# Patient Record
Sex: FEMALE | Employment: OTHER
[De-identification: names, ages, dates, MRNs, and addresses within clinical notes are randomized per-mention and may not be internally consistent; named-entity substitution may affect disease eponyms.]

---

## 2021-03-09 ENCOUNTER — TRANSCRIBE ORDERS (OUTPATIENT)
Dept: OTHER | Age: 41
End: 2021-03-09

## 2021-03-09 DIAGNOSIS — G20.A1 PARKINSON'S DISEASE (H): Primary | ICD-10-CM

## 2021-03-11 NOTE — TELEPHONE ENCOUNTER
RECORDS RECEIVED FROM: External   Date of Appt: 5/7/21   NOTES (FOR ALL VISITS) STATUS DETAILS   OFFICE NOTE from referring provider Care Everywhere Komal AVILES @ [facility] Neurology:  2/25/21   OFFICE NOTE from other specialist Care Everywhere Dr Jany Tsai @ [facility] Neurology:  11/25/20  10/7/20  8/20/20  3/4/20  2/6/20  (additional encounters in Care Everywhere)   DISCHARGE SUMMARY from hospital Care Everywhere [facility]:  2/5/21-2/6/21   DISCHARGE REPORT from the ER N/A    OPERATIVE REPORT N/A    MEDICATION LIST Care Everywhere    IMAGING  (FOR ALL VISITS)     EMG N/A    EEG Care Everywhere [facility]:  1/13/20   LUMBAR PUNCTURE N/A    CARLEY SCAN N/A    ULTRASOUND (CAROTID BILAT) *VASCULAR* N/A    MRI (HEAD, NECK, SPINE) Received [facility]:  MRI Brain 5/7/16   CT (HEAD, NECK, SPINE) Received  [facility]:  CT Head 2/4/21      Action 3/11/21 MV 9.53am   Action Taken Imaging request faxed to [facility] for:  MRI Brain 5/7/16  CT Head 2/4/21     Action 4/9/21 MV 12.53pm   Action Taken Images received from [facility] and resolved in PACS

## 2021-05-06 NOTE — PROGRESS NOTES
Department of Neurology  Movement Disorders Division   Initial Clinic Evaluation     Patient: Fiona Murphy   MRN: [id number]   : 1980   Date of Visit: 2021     Referring Physician: Komal Gabriel MD     Dear Dr. Gabriel, Thank you for your referral of Fiona Murphy for tremor, parkinsonism.    History of Present Illness    Fiona Murphy is a 40-years -old woman form [facility] who presents accompanied today by her mother and  staff from [facility] , her sister was on the phone.     Fiona houston was a diagnosis of Parkinson Disease , she is on L-dopa therapy with no clear benefit in terms of right hand tremor control and right foot pain.    Fiona is complaining of right big toe pain , numbness along the the bilateral upper extremities , and bilateral lower extremities tightness .    Fiona has a history of Global developmental Delay , Seizure ( 1st seisure in )  who was noted to have right hand tremor for at least 9 years . Per staff she was noted to drag her right leg before the onset of right hand tremor. 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     She takes Rytary 145 mg 2 tab 4 times a day and CD/LD CR 25/100 mg -2 tab at night. She might have worsening tremor by the end of the dose during the day . She was noted to have more shuffling gait in the morning and in the evening .  Staff from the [facility] got permission for videotaping , so reviewed 4 videos since 2021.   In one of the videos taken during the night she was noted to have random, flailing , flowing movements involving all extremities , looking like choreiform movements . Per staff this type of movements are exacerbated by the lack of sleep. Since 2021 she has difficulty falling asleep asleep , the Depakote dose was increased and alter Lithium was added with no benefit.     In one of the videos she was noted to have  "the right bot toe extended and c/o pain ( after the first dose of Rytary) . She c/o right foot pain and the bog toe is \"up\" in the morning.    In one of the videos she was noted to have frequent tongue movements , sitting on the ground.    As per chart review there is no exposure to dopamine receptors blocking agents .      Review of Systems:  Other than that mentioned above and at the end of this note, the remainder of 12 systems reviewed were negative.    Medications:  Current Outpatient Medications   Medication Sig Dispense Refill     carbidopa-levodopa (SINEMET CR)  MG CR tablet TAKE 2 TABLETS BY MOUTH ONCE DAILY AT 8PM       carbidopa-levodopa ER (RYTARY) 36. MG CPCR per CR capsule Take 2 capsules by mouth 4 times daily       divalproex sodium delayed-release (DEPAKOTE) 250 MG DR tablet Take 250 mg by mouth 3 times daily       divalproex sodium extended-release (DEPAKOTE ER) 500 MG 24 hr tablet 500 mg       fish oil-omega-3 fatty acids 1000 MG capsule Take 2 g by mouth daily       folic acid (FOLVITE) 1 MG tablet        gabapentin (NEURONTIN) 300 MG capsule 300 mg       lithium ER (LITHOBID) 300 MG CR tablet 300 mg       magnesium oxide (MAG-OX) 400 MG tablet        medroxyPROGESTERone (DEPO-PROVERA) 150 MG/ML IM injection Inject 150 mg into the muscle       Multiple Vitamin (DAILY-SAVANNA) TABS        propranolol (INDERAL) 10 MG tablet 10 mg       RYTARY 36. MG CPCR per CR capsule        trihexyphenidyl (ARTANE) 2 MG tablet 2 mg       vitamin D2 (ERGOCALCIFEROL) 39891 units (1250 mcg) capsule         PD Medications                   6 9:30 1 4:30 8   Rytary 145 mg                                                 2 2 2 2      CD/LD CR 25/100 mg                            2                    Trihexiphenydil                              Allergies: has No Known Allergies.    Past Medical History:   No past medical history on file.    Past Surgical History:   No past surgical history on " file.    Social History:   Social History     Socioeconomic History     Marital status: Single     Spouse name: Not on file     Number of children: Not on file     Years of education: Not on file     Highest education level: Not on file   Occupational History     Not on file   Social Needs     Financial resource strain: Not on file     Food insecurity     Worry: Not on file     Inability: Not on file     Transportation needs     Medical: Not on file     Non-medical: Not on file   Tobacco Use     Smoking status: Not on file   Substance and Sexual Activity     Alcohol use: Not on file     Drug use: Not on file     Sexual activity: Not on file   Lifestyle     Physical activity     Days per week: Not on file     Minutes per session: Not on file     Stress: Not on file   Relationships     Social connections     Talks on phone: Not on file     Gets together: Not on file     Attends Jain service: Not on file     Active member of club or organization: Not on file     Attends meetings of clubs or organizations: Not on file     Relationship status: Not on file     Intimate partner violence     Fear of current or ex partner: Not on file     Emotionally abused: Not on file     Physically abused: Not on file     Forced sexual activity: Not on file   Other Topics Concern     Not on file   Social History Narrative     Not on file       Family History:  No family history on file.       Physical Exam:  The patient's  weight is 53.5 kg (118 lb). Her blood pressure is 121/80 and her pulse is 84. Her respiration is 16 and oxygen saturation is 96%.    Neurological Examination:   She is alert , wears hearing aids, follows commands, becomes distracted very easily and requires frequent reminders and redirections .Speech is dysarthric .  Exam is challenging .  She is in no acute distress. She has intermittent right hand tremor , variable in frequency and amplitude, at times distractible.   Muscle tone: she definitely had difficulty  relaxing, tone was increased in all extremities, may be more in the RUE in comparison to LUE.  It is even more challenging to determine the degree of bradykinesias , but I had an impression tanika she has more bradykinesias with finger tapping and toe tapping on the right than on the left.  Gait: she ambulates with a limp ( I was told she has leg length discrepancy). Gait is narrow based , takes small steps, ambulates slowly.   During pull test I had to cath Fiona, but I am not sure if she understood my directions , even after I repeated them a  couple of times) .     Impression:  Fiona Murphy is a 40 year old female who hold a a diagnosis of Parkinson Disease with  right hand tremor and right foot dystonia. Her symptoms have a variable response to L-dopa therapy and exam with right hand tremor ( with a functional component) .    Recommendations: (as per Patient Instructions).    Since some of the medications you are taking do not provide benefit in terms of tremor control or cause side effects , so we recommend the following :    1.  We recommend discontinue Propranolol 10 mg as following:     Day 1-5 stop the morning dose  Day 6-10 - stop the midday dose  Day 10-15- stop the evening dose       2. Then after getting off Propranolol we recommend discontinue Artane as following:    Week 1- stop the morning dose  Week 2- stop the 4:30 pm dose     3. After being off Propranolol and Artane , report back to us with your symptoms and we will recommend how to taper off the bed time dose of Sinemet.    4. We ordered a special brain scan called dopamine transporter scan to evaluate for presynaptic dopaminergic deficiency.        Thank you for the opportunity to share in the care of this patient.  Please feel free to contact me if you have any further questions or comments.    Patient seen and discussed with Dr. Han.    Sincerely,    Glenna Sunshine MD   Movement Disorders Fellow      Patient seen and examined with Dr. Sunshine  "and I agree with the assessment and plan as outlined.  The RUE tremor I saw today looked functional, not a classical Parkinsonian resting tremor, but functional overlay is common in Parkinson's disease, including functional - on - organic tremor, and the description of tremor worsening at the end of a dose interval was, at least to me, convincing.  Ms. BARNEY herself also gave a rather convincing description of a striatal toe, i.e. R foot dystonia typical for PD;  in her, it appears to be occurring shortly after her first levodopa dose of the morning, so it's not necessarily off dystonia, could be a dystonic dyskinesia.  Tone was difficult to asess, as she had difficulty relaxing her limbs, but I do think she  had R > LUE rigidity.  Her gait was variable;  when formally examined, it was not very parkinsonian but at the end of the visit, leaving the exam room, it looked parkinsonian (This is the opposite of the pattern I'd expect with functional gait, and conceivably represented wearing off at the end of a dose interval).  At least one of the videos (in bed, at night, in February) looked like severe generalized chorea, e.g. really severe levodopa induced dyskinesias of Parkinson's.  (It could also be seen in Lowber's but would be more nearly continuous, not nearly so episodic as her those movements have been.)  Other videos showed movements which looked like having at least some behavioral component.  The choreiform movements are not, however, altogether typical for Parkinson's in that they Patient seen and examined with Dr. Sunshine and I agree with the assessment and plan as outlined.occurred in the middle of the night, when levodopa/dopamine levels would be low.  Possible hypotheses to account for that would be:  A buildup effect of long-acting dopamine formulations accumulating over the course of the day.  \"OFF dyskinesias\" (rare, but reported).  Very delayed gastric emptying or very prolonged gut transit time " ENT resulting in accumulated levodopa in the gut being absorbed en cydney, very much later than the doses were taken.  A constant propensity to dyskinesias, without much diurnal variation, with the movements flaring up in response to sleep deprivation or distress (which typically make chorea of any etiology worse.)  Could the nocturnal problem be primarily discomfort (with writing movements secondary to that)?  If so, dopamine-withdrawal akathisia or RLS might be considerations.  Involuntary movements of almost any kind could be tardive, due to past exposure to dopamine receptor blockers, including drugs for nausea or GI dysmotility, but mostly neuroleptics;  that would be plausible, in someone like Ms. CLINTON, but there has been no such exposure, as far as we could determine.  It's also possible that I'm wrong, that there is no underlying organic condition and these episodes are entirely behavioral.  Plan:  We'll begin by attempting to get rid of some medications which may be superfluous.  First the Artane, which according to the history we got today, was ineffective.  That differs a little from what's been reported in her records, but it's worth establishing, empirically, whether Artane is really helping, or not, since the potential for CNS side effects is non-negligeable.  Propanolol, also described to us today as ineffective, is a little less likely to be causing side effects, but also less likely to be helping her.  Next we'll try stopping the bedtime CR Sinemet, which was reported to be ineffective for the morning R foot dystonia, if that's what it is, and might account for the nocturnal dyskinesias, if that's what they are (although it's rather a low dose, to be doing that).  Meanwhile, we'll try to obtain a CARLEY scan, to help in determining whether there is underlying organic Parkinson's disease.    Tonny Han PhD, MD

## 2021-05-07 ENCOUNTER — OFFICE VISIT (OUTPATIENT)
Dept: NEUROLOGY | Facility: CLINIC | Age: 41
End: 2021-05-07
Attending: PHYSICIAN ASSISTANT
Payer: MEDICARE

## 2021-05-07 VITALS
RESPIRATION RATE: 16 BRPM | OXYGEN SATURATION: 96 % | DIASTOLIC BLOOD PRESSURE: 80 MMHG | WEIGHT: 118 LBS | SYSTOLIC BLOOD PRESSURE: 121 MMHG | HEART RATE: 84 BPM

## 2021-05-07 DIAGNOSIS — R25.1 TREMOR: Primary | ICD-10-CM

## 2021-05-07 PROCEDURE — 99204 OFFICE O/P NEW MOD 45 MIN: CPT | Mod: GC | Performed by: PSYCHIATRY & NEUROLOGY

## 2021-05-07 RX ORDER — DIVALPROEX SODIUM 500 MG/1
500 TABLET, EXTENDED RELEASE ORAL
COMMUNITY
Start: 2021-02-18 | End: 2021-08-03

## 2021-05-07 RX ORDER — DIVALPROEX SODIUM 250 MG/1
250 TABLET, DELAYED RELEASE ORAL 3 TIMES DAILY
COMMUNITY
End: 2021-08-03

## 2021-05-07 RX ORDER — ERGOCALCIFEROL 1.25 MG/1
CAPSULE, LIQUID FILLED ORAL
COMMUNITY
Start: 2021-04-20 | End: 2024-04-26

## 2021-05-07 RX ORDER — LEVODOPA AND CARBIDOPA 145; 36.25 MG/1; MG/1
CAPSULE, EXTENDED RELEASE ORAL
COMMUNITY
Start: 2021-05-01 | End: 2022-03-16

## 2021-05-07 RX ORDER — CHLORAL HYDRATE 500 MG
2 CAPSULE ORAL DAILY
COMMUNITY
End: 2024-04-26

## 2021-05-07 RX ORDER — LITHIUM CARBONATE 300 MG/1
300 TABLET, FILM COATED, EXTENDED RELEASE ORAL
COMMUNITY
Start: 2021-04-23 | End: 2024-04-26

## 2021-05-07 RX ORDER — TEMAZEPAM 30 MG
30 CAPSULE ORAL
COMMUNITY
Start: 2021-04-08 | End: 2021-05-08

## 2021-05-07 RX ORDER — MULTIVITAMIN WITH FOLIC ACID 400 MCG
TABLET ORAL
COMMUNITY
Start: 2021-04-20

## 2021-05-07 RX ORDER — MAGNESIUM OXIDE 400 MG/1
TABLET ORAL
COMMUNITY
Start: 2021-04-20 | End: 2024-04-26

## 2021-05-07 RX ORDER — MEDROXYPROGESTERONE ACETATE 150 MG/ML
150 INJECTION, SUSPENSION INTRAMUSCULAR
COMMUNITY
Start: 2020-07-21

## 2021-05-07 RX ORDER — CARBIDOPA AND LEVODOPA 25; 100 MG/1; MG/1
TABLET, EXTENDED RELEASE ORAL
COMMUNITY
Start: 2020-09-23 | End: 2021-05-26

## 2021-05-07 RX ORDER — FOLIC ACID 1 MG/1
TABLET ORAL
COMMUNITY
Start: 2021-04-20 | End: 2024-04-26

## 2021-05-07 RX ORDER — GABAPENTIN 300 MG/1
300 CAPSULE ORAL
COMMUNITY
Start: 2021-04-20

## 2021-05-07 RX ORDER — TRIHEXYPHENIDYL HYDROCHLORIDE 2 MG/1
2 TABLET ORAL
COMMUNITY
Start: 2021-02-25 | End: 2021-10-19

## 2021-05-07 RX ORDER — PROPRANOLOL HYDROCHLORIDE 10 MG/1
10 TABLET ORAL
COMMUNITY
Start: 2021-04-22 | End: 2021-10-19

## 2021-05-07 ASSESSMENT — PAIN SCALES - GENERAL: PAINLEVEL: EXTREME PAIN (8)

## 2021-05-07 NOTE — PATIENT INSTRUCTIONS
Since some of the medications you are taking do not provide benefit in terms of tremor control or cause side effects , so we recommend the following :    1.  We recommend discontinue Propranolol 10 mg as following:     Day 1-5 stop the morning dose  Day 6-10 - stop the midday dose  Day 10-15- stop the evening dose       2. Then after getting off Propranolol we recommend discontinue Artane as following:    Week 1- stop the morning dose  Week 2- stop the 4:30 pm dose     3. After being off Propranolol and Artane , report back to us with your symptoms and we will recommend how to taper off the bed time dose of Sinemet.    4. We ordered a special brain scan called dopamine transporter scan to evaluate for presynaptic dopaminergic deficiency.

## 2021-05-07 NOTE — NURSING NOTE
Chief Complaint   Patient presents with     Consult     P NEW MOVEMENT DISORDER      Nam Valdivia

## 2021-05-17 ENCOUNTER — PRE VISIT (OUTPATIENT)
Dept: NEUROLOGY | Facility: CLINIC | Age: 41
End: 2021-05-17

## 2021-05-24 ENCOUNTER — TELEPHONE (OUTPATIENT)
Dept: NEUROLOGY | Facility: CLINIC | Age: 41
End: 2021-05-24

## 2021-05-24 DIAGNOSIS — R25.1 TREMOR: Primary | ICD-10-CM

## 2021-05-26 RX ORDER — CARBIDOPA AND LEVODOPA 25; 100 MG/1; MG/1
TABLET, EXTENDED RELEASE ORAL
Qty: 180 TABLET | Refills: 0 | Status: SHIPPED | OUTPATIENT
Start: 2021-05-26 | End: 2021-10-19

## 2021-05-26 NOTE — TELEPHONE ENCOUNTER
Please wean off Carbidopa/Levodopa CR 25/100 mg- 2 tab at bedtime as following:     Week 1: 1 tab at bedtime daily   Week 2: 1/2 tab at bedtime daily   Week 3: 1/2 tab amrit other day at bedtime  Week 4: Stop      Please, report back to us in 1-2 weeks being off .    Glenna Sunshine MD  Movement Disorders Fellow

## 2021-05-26 NOTE — CONFIDENTIAL NOTE
Returned call and discussed with Britt about Fiona's symptoms . She continues to have involuntary movements ( flailing and shaking of extremities) . She has 2 more days of Trihexiphenydil before she is weaned off.  She will be started on Seroquel 50 mg at bedtime per psychiatrist.  She continues to have good and bad days .    As discussed during clinic visit , next step is to wean off Carbidopa/Levodopa CR 25/100 mg- 2 tab at bedtime as following:    Week 1: 1 tab at bedtime daily   Week 2: 1/2 tab at bedtime daily   Week 3: 1/2 tab amrit other day at bedtime  Week 4: Stop     Please, report back to us in 1-2 weeks being off Carbidopa/Levodopa CR 25/100 mg.    Glenna Sunshine MD  Movement Disorders Fellow

## 2021-06-19 ENCOUNTER — TELEPHONE (OUTPATIENT)
Dept: NUCLEAR MEDICINE | Facility: CLINIC | Age: 41
End: 2021-06-19

## 2021-06-28 ENCOUNTER — TELEPHONE (OUTPATIENT)
Dept: NEUROLOGY | Facility: CLINIC | Age: 41
End: 2021-06-28

## 2021-06-28 NOTE — TELEPHONE ENCOUNTER
"Spoke to Britt. Recommended she ask the proceduralist doing the colonoscopy if she could have her Rytary with a small sip of water. The doctor's care team told her \"absolutely not.\"    Britt needs an order stating:    \"On day of colonoscopy, OK to give 1:00 dose of Rytary to patient after colonoscopy and adjust the following two medication administration times per regular dose intervals (3.5 hours after).\"    This order will need to be faxed to Britt at: [phone]  "

## 2021-06-28 NOTE — TELEPHONE ENCOUNTER
Letter was fax to Britt @ [phone], per Melody AGUILERA.number was provided by Melody in message

## 2021-06-28 NOTE — TELEPHONE ENCOUNTER
[facility] Call Center    Phone Message    May a detailed message be left on voicemail: yes     Reason for Call: Medication Question or concern regarding medication   Prescription Clarification  Name of Medication: RYTARY 36. MG CPCR per CR capsule  Prescribing Provider: Dr. Han   Pharmacy: NA   What on the order needs clarification? Per Britt at [facility], Pt is to have colonoscopy and cannot have anything to eat or drink 3 hour prior. Therefore Pt will miss one dose of medication.    Please fax Britt to let her know if this is okay at [phone]          Action Taken: Message routed to:  [facility] Neurology    Travel Screening: Not Applicable

## 2021-06-28 NOTE — LETTER
June 28, 2021          To whom it may concern,    On day of colonoscopy, OK to give 1:00 dose of Rytary to patient after colonoscopy and adjust the following two medication administration times per regular dose intervals (3.5 hours after).    Sincerely,  Diana Fink MD

## 2021-06-29 ENCOUNTER — TELEPHONE (OUTPATIENT)
Dept: NEUROLOGY | Facility: CLINIC | Age: 41
End: 2021-06-29

## 2021-06-29 ENCOUNTER — TRANSFERRED RECORDS (OUTPATIENT)
Dept: HEALTH INFORMATION MANAGEMENT | Facility: CLINIC | Age: 41
End: 2021-06-29

## 2021-06-29 NOTE — TELEPHONE ENCOUNTER
"Spoke to Britt and gave her the message below and reminded her the daTscan is in [place]. She thanked me for the call.    ----- Message from Tonny Han MD sent at 6/29/2021  3:21 PM CDT -----  Regarding: Please respond (briefly) by 'phone to fax received.  We got a fax regarding this patient from  Britt Lynn  , [facility] [employer]  Cell: [phone]  Work: [phone]  Fax: [phone]    At last visit we asked the staff at the home where pt lives for a report on how the pt responded to the med changes we made, and this was the response.    Ms. Lynn (the staff, not the patient) concludes by asking \"let me know how you would like to proceed\"    Could you call her and tell her that I want to leave things as they are, and I'll consider next steps after the Naveed scan (which is scheduled July 9)?  Maybe also remind them it's at a different location from the appointment with me.    Thanks.    --SC      "

## 2021-07-09 ENCOUNTER — ANCILLARY PROCEDURE (OUTPATIENT)
Dept: NUCLEAR MEDICINE | Facility: CLINIC | Age: 41
End: 2021-07-09
Payer: MEDICARE

## 2021-07-09 ENCOUNTER — TRANSFERRED RECORDS (OUTPATIENT)
Dept: HEALTH INFORMATION MANAGEMENT | Facility: CLINIC | Age: 41
End: 2021-07-09

## 2021-07-09 DIAGNOSIS — R25.1 TREMOR: ICD-10-CM

## 2021-07-09 PROCEDURE — A9584 IODINE I-123 IOFLUPANE: HCPCS | Performed by: STUDENT IN AN ORGANIZED HEALTH CARE EDUCATION/TRAINING PROGRAM

## 2021-07-09 PROCEDURE — G1004 CDSM NDSC: HCPCS | Performed by: STUDENT IN AN ORGANIZED HEALTH CARE EDUCATION/TRAINING PROGRAM

## 2021-07-09 PROCEDURE — 78803 RP LOCLZJ TUM SPECT 1 AREA: CPT | Mod: 53 | Performed by: STUDENT IN AN ORGANIZED HEALTH CARE EDUCATION/TRAINING PROGRAM

## 2021-07-09 NOTE — PROGRESS NOTES
Pt presented at [facility] for a DatScan om 7/9/21. Pt was in a good and compliant mood at onset. Pt took Lugol's 2 ml without a problem at 1020. IV started. Returned 1 hour post Lugols' for nuclear injection. Pt with mother until returning for actual scan at approximately 1345. During scan patient started to move uncontrollably. Scan was discontinued and Pt's symptoms worsened. Help was alerted and respond. Rapid then called and team responded. We where unable to obtain BP or 12 lead due to Pt's convulsion like movements. Ambulance called as team continued calming techniques. Pt complained of tight pressure and severe pain in upper abdomen throughout treatment. EMS responded and decision was make to transported Pt to [facility] for further treatment.

## 2021-07-09 NOTE — PROGRESS NOTES
Pt presented at [facility] for a DatScan om 7/9/21. Pt was in a good and compliant mood at onset. Pt took Lugol's 2 ml without a problem at 1020. IV started. Returned 1 hour post Lugols' for nuclear injection. Pt with mother until returning for actual scan at approximately 1345. During scan patient started to move uncontrollably. Scan was discontinued and Pt's symptoms worsned. Help was alerated and respond. Rapid then called and team responded. We where unable to obtain BP or 12 lead due to Pt's convulsion like movements. Ambulance called as team continued calming techniques. Pt complained of tight pressure and severe pain in upper abdomen throughout treatment. EMS responded and decision was make to transported Pt to [facility] for further treatment.

## 2021-07-22 DIAGNOSIS — R25.1 TREMOR: Primary | ICD-10-CM

## 2021-07-22 NOTE — TELEPHONE ENCOUNTER
Health Call Center    Phone Message    May a detailed message be left on voicemail: yes     Reason for Call: Medication Refill Request    Has the patient contacted the pharmacy for the refill? Yes   Name of medication being requested: RYTARY 36. MG CPCR per CR capsule [[id number]] (Order [id number])  Provider who prescribed the medication:   Pharmacy: [facility] fax [phone]  Date medication is needed: ASAP    Patient switched care from [facility] to Dr. Han.  Danna prescribed the above medication and patient would like for Dr. Han to refill medication.  Patient takes 2 caps 4 times a day.  Please reach out to Britt should you have any questions.    Action Taken: Message routed to:  [facility] Neuro    Travel Screening: Not Applicable

## 2021-07-30 RX ORDER — DIVALPROEX SODIUM 250 MG/1
TABLET, DELAYED RELEASE ORAL
Qty: 31 TABLET | Refills: 11
Start: 2021-07-30

## 2021-07-30 RX ORDER — DIVALPROEX SODIUM 500 MG/1
TABLET, EXTENDED RELEASE ORAL
Qty: 93 TABLET | Refills: 11
Start: 2021-07-30

## 2021-07-30 NOTE — TELEPHONE ENCOUNTER
Rx Authorization:  Requested Medication/ Dose Divalproex Sodium 250 MG Tablet delayed release  Date last refill ordered:   Quantity ordered:   # refills:   Date of last clinic visit with ordering provider: 5/7/21  Date of next clinic visit with ordering provider:   All pertinent protocol data (lab date/result):   Include pertinent information from patients message:       Rx Authorization:  Requested Medication/ Dose Divalproex ER 500mg  Date last refill ordered:   Quantity ordered:   # refills:   Date of last clinic visit with ordering provider: 5/7/21  Date of next clinic visit with ordering provider:   All pertinent protocol data (lab date/result):   Include pertinent information from patients message:

## 2021-08-02 ENCOUNTER — TELEPHONE (OUTPATIENT)
Dept: NEUROLOGY | Facility: CLINIC | Age: 41
End: 2021-08-02

## 2021-08-02 DIAGNOSIS — G40.909 SEIZURE DISORDER (H): Primary | ICD-10-CM

## 2021-08-02 DIAGNOSIS — R25.1 TREMOR: ICD-10-CM

## 2021-08-02 NOTE — TELEPHONE ENCOUNTER
[facility] Call Center    Phone Message    May a detailed message be left on voicemail: yes     Reason for Call: Other: Britt at [facility] is faxing medication requests for Pt of Dr. Han. She just wanted to give clinic a heads up.     Action Taken: Message routed to:  [facility] Neurology    Travel Screening: Not Applicable

## 2021-08-03 RX ORDER — DIVALPROEX SODIUM 250 MG/1
TABLET, DELAYED RELEASE ORAL
Qty: 30 TABLET | Refills: 1 | Status: SHIPPED | OUTPATIENT
Start: 2021-08-03 | End: 2021-09-20

## 2021-08-03 RX ORDER — DIVALPROEX SODIUM 500 MG/1
TABLET, EXTENDED RELEASE ORAL
Qty: 90 TABLET | Refills: 1 | Status: SHIPPED | OUTPATIENT
Start: 2021-08-03 | End: 2021-09-20

## 2021-08-03 NOTE — TELEPHONE ENCOUNTER
I informed Britt that Dr. Han sent in a small supply to [facility]. I informed her he is movement disorder specialist and going forward she will need to see another epilepsy specialist to prescribe this. Britt stated Dr. Monroy actually referred her to have Fiona's psychiatrist prescribe it. Britt will be contacting the psychiatrist to work this out. She appreciates Dr. Han sending in some refills to buy her some time.    Depakote prescriptions faxed to [facility].

## 2021-08-17 ENCOUNTER — TELEPHONE (OUTPATIENT)
Dept: NEUROLOGY | Facility: CLINIC | Age: 41
End: 2021-08-17

## 2021-08-17 DIAGNOSIS — R25.1 TREMOR: Primary | ICD-10-CM

## 2021-08-17 NOTE — TELEPHONE ENCOUNTER
Health Call Center    Phone Message    May a detailed message be left on voicemail: yes     Reason for Call: Medication Question or concern regarding medication   Prescription Clarification  Name of Medication: some type of sedation needed in order for pt to do the scan on 9/21/21  Prescribing Provider: Dr. Tonny Han   Pharmacy: [facility] in [place]   What on the order needs clarification? Pt's mother reporting that pt was unable to do the scan the first time and will need sedation please. Thank you.          Action Taken: Message routed to:  [facility]    Travel Screening: Not Applicable

## 2021-08-18 RX ORDER — LORAZEPAM 0.5 MG/1
TABLET ORAL
Qty: 5 TABLET | Refills: 0 | Status: SHIPPED | OUTPATIENT
Start: 2021-08-18 | End: 2021-10-19

## 2021-08-20 NOTE — TELEPHONE ENCOUNTER
I let Michelle know Dr. Han had sent in an RX. I will call the group home with his instructions.    Spoke with Britt, coordinator at Fiona's group home. I read her Dr. Bruce instructions. She read them back to me correctly. She reports [place] has not allowed Fiona's mom to set up guardian access to MyChart and they do not know why. Upon chart review I noted we do not have the legal guardianship papers on file. This is likely the reason why. I gave Britt our fax number to get these papers sent to us. Britt will also fax us a report on how the lorazepam trial goes. They will try this tomorrow.

## 2021-09-06 ENCOUNTER — DOCUMENTATION ONLY (OUTPATIENT)
Dept: OTHER | Facility: CLINIC | Age: 41
End: 2021-09-06

## 2021-09-08 ENCOUNTER — TELEPHONE (OUTPATIENT)
Dept: NUCLEAR MEDICINE | Facility: CLINIC | Age: 41
End: 2021-09-08

## 2021-09-08 ENCOUNTER — TELEPHONE (OUTPATIENT)
Dept: NEUROLOGY | Facility: CLINIC | Age: 41
End: 2021-09-08

## 2021-09-08 DIAGNOSIS — G20.C PARKINSONISM (H): Primary | ICD-10-CM

## 2021-09-08 NOTE — TELEPHONE ENCOUNTER
Elliott Han,    Per the patient's medicare insurance, the Dx code of G20, Parkinsonism is required to be added onto the Datscan order for medical necessity purposes. Wondering if you'd be able to add this code to the current order? The patient is scheduled on 9/21/2021 and the order will not be able to updated once the scan is in process.     Thank you for your time and attention on this,    Sofia Otto  Senior Intake Financial Counselor  [address] [address]  Ph: [phone]  Fax: [phone]

## 2021-09-17 DIAGNOSIS — G40.909 SEIZURE DISORDER (H): ICD-10-CM

## 2021-09-17 NOTE — TELEPHONE ENCOUNTER
Rx Authorization:  Requested Medication/ Dose Divalproex Sodium  MG Tablet extended release 24 hr  Date last refill ordered: 8/3/21  Quantity ordered: 90 tabs  # refills: 1  Date of last clinic visit with ordering provider: 5/7/21  Date of next clinic visit with ordering provider:   All pertinent protocol data (lab date/result):   Include pertinent information from patients message:      Subjective:    HPI                    Objective:    System    Physical Exam    General     ROS    Assessment/Plan:      SUBJECTIVE  Subjective changes as noted by pt:  Rand reports that she continues to see improvement.  Orderered night splint and that's arriving tonight.  Pain is more localized.       Current pain level: 6/10     Changes in function:  Yes (See Goal flowsheet attached for changes in current functional level)     Adverse reaction to treatment or activity:  None    OBJECTIVE  Changes in objective findings:  Yes, R ankle DF AROM 11.          ASSESSMENT  Rand continues to require intervention to meet STG and LTG's: PT  Patient's symptoms are resolving.  Patient is progressing as expected.  Response to therapy has shown an improvement in  pain level and function  Progress made towards STG/LTG?  Yes (See Goal flowsheet attached for updates on achievement of STG and LTG)    PLAN  Continue current treatment plan until patient demonstrates readiness to progress to higher level exercises.    PTA/ATC plan:  N/A    Please refer to the daily flowsheet for treatment today, total treatment time and time spent performing 1:1 timed codes.

## 2021-09-20 DIAGNOSIS — G40.909 SEIZURE DISORDER (H): ICD-10-CM

## 2021-09-20 RX ORDER — DIVALPROEX SODIUM 500 MG/1
TABLET, EXTENDED RELEASE ORAL
Qty: 90 TABLET | Refills: 11
Start: 2021-09-20

## 2021-09-20 NOTE — TELEPHONE ENCOUNTER
Britt Jaimes will be establishing care with Dr. Ramirez at [facility] on 10/4 for her Epilepsy care. Fiona's psychiatrist will not be taking over this medication. She asks if Dr. Han can refill it 1 more time until she establishes care.

## 2021-09-21 ENCOUNTER — ANCILLARY PROCEDURE (OUTPATIENT)
Dept: NUCLEAR MEDICINE | Facility: CLINIC | Age: 41
End: 2021-09-21
Payer: MEDICARE

## 2021-09-21 DIAGNOSIS — G20.C PARKINSONISM (H): ICD-10-CM

## 2021-09-21 PROCEDURE — A9584 IODINE I-123 IOFLUPANE: HCPCS

## 2021-09-21 PROCEDURE — 78803 RP LOCLZJ TUM SPECT 1 AREA: CPT | Mod: 26

## 2021-09-21 RX ORDER — DIVALPROEX SODIUM 500 MG/1
TABLET, EXTENDED RELEASE ORAL
Qty: 90 TABLET | Refills: 0 | Status: SHIPPED | OUTPATIENT
Start: 2021-09-21

## 2021-09-21 RX ORDER — DIVALPROEX SODIUM 250 MG/1
TABLET, DELAYED RELEASE ORAL
Qty: 30 TABLET | Refills: 0 | Status: SHIPPED | OUTPATIENT
Start: 2021-09-21

## 2021-10-05 ENCOUNTER — MYC MEDICAL ADVICE (OUTPATIENT)
Dept: NEUROLOGY | Facility: CLINIC | Age: 41
End: 2021-10-05

## 2021-10-18 NOTE — PROGRESS NOTES
Department of Neurology  Movement Disorders Division   New Patient Visit    Patient: Fiona Murphy   MRN: [id number]   : 1980   Date of Visit: 10/18/2021    CC: botulinum toxin evaluation    HPI:  Ms. Murphy is a 42 yo woman with PD who presents for evaluation of botulinum toxin injections for dystonia.  She is accompanied by her mother and caregiver.    Occurs in the right foot every morning until 8am as well as in the evenings.  When its happening she must crawl.  It is painful. The toes are curled and her foot is turned in.  Botox was previously ineffective.  She did not clearly worsen after discontinuing Artane.  A bedtime dose of Sinemet did not help the dystonia.    She is also having bowel and bladder incontinence, dyskinesias, and trouble dressing.  They wonder if her incontinence is behavioral.  Dyskinesias occur mainly at night and are also possibly behavioral.  The video provided showed dyskinesias occurring at 4pm prior to the 4:30pm dose.  Other times it occurs right after a dose.    Related Medications 6am 9:30am 1pm 4:30pm   Rytary 145mg 2 2 2 2       ROS:  All others negative except as listed above.    No past medical history on file.     No past surgical history on file.     Current Outpatient Medications   Medication Sig Dispense Refill     busPIRone (BUSPAR) 15 MG tablet Take 1/2 tab twice daily for 7 days then increase to 1 tab twice daily therafter.       carbidopa-levodopa ER (RYTARY) 36. MG CPCR per CR capsule Take 2 capsules by mouth 4 times daily 720 capsule 1     divalproex sodium delayed-release (DEPAKOTE) 250 MG DR tablet Divalproex Sodium  mg 1 tab in the evening 30 tablet 0     divalproex sodium extended-release (DEPAKOTE ER) 500 MG 24 hr tablet TAKE 1 TABLET BY MOUTH EVERY MORNING,;TAKE 2 TABLETS (1000MG) BY MOUTH EVERY EVENING. 90 tablet 0     fish oil-omega-3 fatty acids 1000 MG capsule Take 2 g by mouth daily       folic acid (FOLVITE) 1 MG tablet        gabapentin  (NEURONTIN) 300 MG capsule 300 mg       lithium ER (LITHOBID) 300 MG CR tablet 300 mg       magnesium oxide (MAG-OX) 400 MG tablet        medroxyPROGESTERone (DEPO-PROVERA) 150 MG/ML IM injection Inject 150 mg into the muscle       Multiple Vitamin (DAILY-SAVANNA) TABS        omeprazole (PRILOSEC) 40 MG DR capsule        QUEtiapine (SEROQUEL) 50 MG tablet Take 50 mg by mouth       RYTARY 36. MG CPCR per CR capsule        senna-docusate (SENOKOT-S/PERICOLACE) 8.6-50 MG tablet Take 1 tablet by mouth       vitamin D2 (ERGOCALCIFEROL) 29282 units (1250 mcg) capsule          No Known Allergies     No family history on file.     Social History     Socioeconomic History     Marital status: Single     Spouse name: Not on file     Number of children: Not on file     Years of education: Not on file     Highest education level: Not on file   Occupational History     Not on file   Tobacco Use     Smoking status: Not on file   Substance and Sexual Activity     Alcohol use: Not on file     Drug use: Not on file     Sexual activity: Not on file   Other Topics Concern     Not on file   Social History Narrative     Not on file     Social Determinants of Health     Financial Resource Strain:      Difficulty of Paying Living Expenses:    Food Insecurity:      Worried About Running Out of Food in the Last Year:      Ran Out of Food in the Last Year:    Transportation Needs:      Lack of Transportation (Medical):      Lack of Transportation (Non-Medical):    Physical Activity:      Days of Exercise per Week:      Minutes of Exercise per Session:    Stress:      Feeling of Stress :    Social Connections:      Frequency of Communication with Friends and Family:      Frequency of Social Gatherings with Friends and Family:      Attends [employer] Services:      Active Member of Clubs or Organizations:      Attends Club or Organization Meetings:      Marital Status:    Intimate Partner Violence:      Fear of Current or Ex-Partner:       Emotionally Abused:      Physically Abused:      Sexually Abused:         PHYSICAL EXAM:  /74   Pulse 101   Resp 16   SpO2 97%   Slight left hand rest tremor.  LUE cogwheeling.  Increased rigidity throughout.  Right dorsiflexion and plantar flexion 5/5.  RUE and RLE dyskinesia.        ASSESSMENT/PLAN:  Ms. Murphy is a 40 yo woman with PD who presents for evaluation for botulinum toxin injection for right foot dystonia.  This is painful and functionally limited.  Did not seem to respond to Artane.  Will plan for injection of tibialis posterior, flexor digitorum brevis, and flexor digitorum longus.    - 300 units Botox ordered  - RTC 2 weeks for injection      Diana Fink MD  Movement Disorders Fellow      20 minutes spent on the date of the encounter doing chart review, history and exam, documentation and further activities as noted above.

## 2021-10-19 ENCOUNTER — TRANSFERRED RECORDS (OUTPATIENT)
Dept: HEALTH INFORMATION MANAGEMENT | Facility: CLINIC | Age: 41
End: 2021-10-19

## 2021-10-19 ENCOUNTER — OFFICE VISIT (OUTPATIENT)
Dept: NEUROLOGY | Facility: CLINIC | Age: 41
End: 2021-10-19
Payer: MEDICARE

## 2021-10-19 VITALS
HEART RATE: 101 BPM | SYSTOLIC BLOOD PRESSURE: 124 MMHG | RESPIRATION RATE: 16 BRPM | OXYGEN SATURATION: 97 % | DIASTOLIC BLOOD PRESSURE: 74 MMHG

## 2021-10-19 DIAGNOSIS — G20.A1 PARKINSON'S DISEASE (H): Primary | ICD-10-CM

## 2021-10-19 DIAGNOSIS — G24.8 FOCAL DYSTONIA: ICD-10-CM

## 2021-10-19 PROCEDURE — 99213 OFFICE O/P EST LOW 20 MIN: CPT | Performed by: STUDENT IN AN ORGANIZED HEALTH CARE EDUCATION/TRAINING PROGRAM

## 2021-10-19 RX ORDER — OMEPRAZOLE 40 MG/1
CAPSULE, DELAYED RELEASE ORAL
COMMUNITY
Start: 2021-08-27

## 2021-10-19 RX ORDER — AMOXICILLIN 250 MG
1 CAPSULE ORAL
COMMUNITY
Start: 2021-10-07

## 2021-10-19 RX ORDER — BUSPIRONE HYDROCHLORIDE 15 MG/1
TABLET ORAL
COMMUNITY
Start: 2021-07-22 | End: 2024-04-26

## 2021-10-19 RX ORDER — QUETIAPINE FUMARATE 50 MG/1
50 TABLET, FILM COATED ORAL
COMMUNITY
Start: 2021-05-20 | End: 2024-04-26

## 2021-10-19 ASSESSMENT — PAIN SCALES - GENERAL: PAINLEVEL: NO PAIN (0)

## 2021-10-19 NOTE — NURSING NOTE
Chief Complaint   Patient presents with     RECHECK     [facility] RETURN MOVEMENT DISORDER      Nam Valdivia\

## 2021-10-31 ENCOUNTER — HEALTH MAINTENANCE LETTER (OUTPATIENT)
Age: 41
End: 2021-10-31

## 2021-11-08 NOTE — PROGRESS NOTES
Movement Disorders Botulinum Toxin Clinic Note    Chief Complaint: right foot dystonia    History of Present Illness:  Fiona Murphy is a 41 year old female who presents to clinic for botulinum toxin injections for treatment of right foot dystonia 2/2 PD.  This is her first injection with us.        Current Outpatient Medications   Medication Sig Dispense Refill     busPIRone (BUSPAR) 15 MG tablet Take 1/2 tab twice daily for 7 days then increase to 1 tab twice daily therafter.       divalproex sodium delayed-release (DEPAKOTE) 250 MG DR tablet Divalproex Sodium  mg 1 tab in the evening 30 tablet 0     divalproex sodium extended-release (DEPAKOTE ER) 500 MG 24 hr tablet TAKE 1 TABLET BY MOUTH EVERY MORNING,;TAKE 2 TABLETS (1000MG) BY MOUTH EVERY EVENING. 90 tablet 0     fish oil-omega-3 fatty acids 1000 MG capsule Take 2 g by mouth daily       folic acid (FOLVITE) 1 MG tablet        gabapentin (NEURONTIN) 300 MG capsule 300 mg 300mg in the morning  2 caps (600mg) at bed time       lithium ER (LITHOBID) 300 MG CR tablet 300 mg       magnesium oxide (MAG-OX) 400 MG tablet        medroxyPROGESTERone (DEPO-PROVERA) 150 MG/ML IM injection Inject 150 mg into the muscle       Multiple Vitamin (DAILY-SAVANNA) TABS        omeprazole (PRILOSEC) 40 MG DR capsule        QUEtiapine (SEROQUEL) 50 MG tablet Take 50 mg by mouth       RYTARY 36. MG CPCR per CR capsule        senna-docusate (SENOKOT-S/PERICOLACE) 8.6-50 MG tablet Take 1 tablet by mouth       vitamin D2 (ERGOCALCIFEROL) 67924 units (1250 mcg) capsule        carbidopa-levodopa ER (RYTARY) 36. MG CPCR per CR capsule Take 2 capsules by mouth 4 times daily (Patient not taking: Reported on 11/10/2021) 720 capsule 1       Allergies: She has No Known Allergies.    No past medical history on file.    No past surgical history on file.    Social History     Socioeconomic History     Marital status: Single     Spouse name: Not on file     Number of children: Not on  file     Years of education: Not on file     Highest education level: Not on file   Occupational History     Not on file   Tobacco Use     Smoking status: Never Smoker     Smokeless tobacco: Never Used   Substance and Sexual Activity     Alcohol use: Not on file     Drug use: Not on file     Sexual activity: Not on file   Other Topics Concern     Not on file   Social History Narrative     Not on file     Social Determinants of Health     Financial Resource Strain: Not on file   Food Insecurity: Not on file   Transportation Needs: Not on file   Physical Activity: Not on file   Stress: Not on file   Social Connections: Not on file   Intimate Partner Violence: Not on file   Housing Stability: Not on file       No family history on file.    Physical Examination:  Vital Signs:   temperature is 98  F (36.7  C). Her pulse is 102. Her respiration is 16 and oxygen saturation is 100%.   Intermittent foot inversion and toes tonically curled under      BOTULINUM NEUROTOXIN INJECTION PROCEDURES:    VERIFICATION OF PATIENT IDENTIFICATION AND PROCEDURE     Initials   Patient Name acc   Patient  acc   Procedure Verified by: acc     Above assessments performed by:  Diana Fink MD      INDICATION/S FOR PROCEDURE/S:  Fiona Murphy is a 41 year old year old patient with dystonia affecting the  right lower extremity secondary to a diagnosis of Parkinson's disease with associated  pain, loss of volitional motor control and difficulty with ambulation and transfers.     Her baseline symptoms have been recalcitrant to oral medications and conservative therapy.  She is here today for an injection of Botox.      GOAL OF PROCEDURE:  The goal of this procedure is to improve volitional motor control, decrease pain  and enhance functional independence associated with dystonic movements.    TOTAL DOSE ADMINISTERED:  Dose Administered: 120 units Botox    Diluent Used:  Preservative Free Normal Saline  Total Volume of Diluent Used:  2  haim  NDC #: Botox 100u ([address]-19)    CONSENT:  The risks, benefits, and treatment options were discussed with Fiona Murphy and she agreed to proceed.      Written consent was obtained by acc.     EQUIPMENT USED:  Needle-37mm stimulating/recording  EMG/NCS Machine    SKIN PREPARATION:  Skin preparation was performed using an alcohol wipe.    GUIDANCE DESCRIPTION:  Electro-myographic guidance was necessary throughout the procedure to accurately identify all areas of dystonic muscles while avoiding injection of non-dystonic muscles, neighboring nerves and nearby vascular structures.     AREA/MUSCLE INJECTED:      Visual display of locations injections are scanned into the chart under MEDIA tab.    Muscles Injected Units Injected Number of Injections   Right tibialis posterior 60 1   Right flexor digitorum longus 30 1   Right flexor digitorum brevis 30 1        Total Units Injected: 120    Unavoidable Waste: 80    Total Units Billed 200      The patient tolerated the injections without difficulty.      Assessment:    Fiona Murphy is a 41 year old female with right foot dystonia 2/2 PD.  Today we did initial botulinum toxin injections.    Plan  Follow-up in 3 months' time to consider repeat injections and over video in 4 weeks to check in on symptoms

## 2021-11-10 ENCOUNTER — OFFICE VISIT (OUTPATIENT)
Dept: NEUROLOGY | Facility: CLINIC | Age: 41
End: 2021-11-10
Payer: MEDICARE

## 2021-11-10 VITALS — RESPIRATION RATE: 16 BRPM | OXYGEN SATURATION: 100 % | TEMPERATURE: 98 F | HEART RATE: 102 BPM

## 2021-11-10 DIAGNOSIS — G20.A1 PARKINSON'S DISEASE (H): ICD-10-CM

## 2021-11-10 DIAGNOSIS — G24.8 FOCAL DYSTONIA: Primary | ICD-10-CM

## 2021-11-10 PROCEDURE — 95874 GUIDE NERV DESTR NEEDLE EMG: CPT | Performed by: STUDENT IN AN ORGANIZED HEALTH CARE EDUCATION/TRAINING PROGRAM

## 2021-11-10 PROCEDURE — 64642 CHEMODENERV 1 EXTREMITY 1-4: CPT | Performed by: STUDENT IN AN ORGANIZED HEALTH CARE EDUCATION/TRAINING PROGRAM

## 2021-11-10 ASSESSMENT — PAIN SCALES - GENERAL: PAINLEVEL: NO PAIN (0)

## 2021-12-06 NOTE — PROGRESS NOTES
"Video-Visit Details    The patient has been notified of following:     \"After a review of the patient s situation, this visit was changed from an in-person visit to a video visit to reduce the risk of COVID-19 exposure.   The patient is being evaluated via a billable video visit.\"    \"This video visit will be conducted via a call between you and your physician/provider. We have found that certain health care needs can be provided without the need for an in-person physical exam.  This service lets us provide the care you need with a video conversation.  If a prescription is necessary we can send it directly to your pharmacy.  If lab work is needed we can place an order for that and you can then stop by our lab to have the test done at a later time.    If during the course of the call the physician/provider feels a video visit is not appropriate, you will not be charged for this service.\"     Patient has given verbal consent for Video visit? Yes    Patient would like the video invitation sent by: Leif    Type of service:  Video Visit    Video Start Time: 9:34 AM    Video End Time (time video stopped): 9:42 AM    Duration: 8 min    Originating Location (pt. Location): Other retirement    Distant Location (provider location):  [facility] [place]     Mode of Communication:  Video Conference via [employer]  -----------------------------------------------------------------------------------------------------------------------  Department of Neurology  Movement Disorders Division   Follow-up Note    Patient: Fiona Murphy   MRN: [id number]   : 1980   Date of Visit: 2021    INTERVAL EVENTS:  Ms. Murphy is a 42 yo woman with PD c/b dystonia who presents for follow-up of initial botulinum toxin injections on 2021.  History is coorborated by her mother and caregiver who are present for the visit.  They report that the injections have had no impact.  She continues to crawl every " day and they haven't noticed any improvement in the dystonia itself.  Fiona says that her foot doesn't feel very good and that she didn't notice any difference after the injections either.  They deny any side effects from the injection.    They also note that her tremors are so severe that the home must do everything for her.  With her mother, Fiona is able to do all of her own care.  The behaviors only occur at the group home and at work.    They note that her hands and feet are clammy.      PD Medications 6am 9:30am 1pm 4:30pm   Rytary 145mg 2 2 2 2       ROS:  All others negative except as listed above.    No past medical history on file.     No past surgical history on file.     Current Outpatient Medications   Medication Sig Dispense Refill     busPIRone (BUSPAR) 15 MG tablet Take 1/2 tab twice daily for 7 days then increase to 1 tab twice daily therafter.       divalproex sodium delayed-release (DEPAKOTE) 250 MG DR tablet Divalproex Sodium  mg 1 tab in the evening 30 tablet 0     divalproex sodium extended-release (DEPAKOTE ER) 500 MG 24 hr tablet TAKE 1 TABLET BY MOUTH EVERY MORNING,;TAKE 2 TABLETS (1000MG) BY MOUTH EVERY EVENING. 90 tablet 0     fish oil-omega-3 fatty acids 1000 MG capsule Take 2 g by mouth daily       folic acid (FOLVITE) 1 MG tablet        gabapentin (NEURONTIN) 300 MG capsule 300 mg 300mg in the morning  2 caps (600mg) at bed time       lithium ER (LITHOBID) 300 MG CR tablet 300 mg       magnesium oxide (MAG-OX) 400 MG tablet        medroxyPROGESTERone (DEPO-PROVERA) 150 MG/ML IM injection Inject 150 mg into the muscle       Multiple Vitamin (DAILY-SAVANNA) TABS        omeprazole (PRILOSEC) 40 MG DR capsule        QUEtiapine (SEROQUEL) 50 MG tablet Take 50 mg by mouth       RYTARY 36. MG CPCR per CR capsule        senna-docusate (SENOKOT-S/PERICOLACE) 8.6-50 MG tablet Take 1 tablet by mouth       vitamin D2 (ERGOCALCIFEROL) 27872 units (1250 mcg) capsule          No Known  Allergies     No family history on file.     Social History     Socioeconomic History     Marital status: Single     Spouse name: Not on file     Number of children: Not on file     Years of education: Not on file     Highest education level: Not on file   Occupational History     Not on file   Tobacco Use     Smoking status: Never Smoker     Smokeless tobacco: Never Used   Substance and Sexual Activity     Alcohol use: Not on file     Drug use: Not on file     Sexual activity: Not on file   Other Topics Concern     Not on file   Social History Narrative     Not on file     Social Determinants of Health     Financial Resource Strain: Not on file   Food Insecurity: Not on file   Transportation Needs: Not on file   Physical Activity: Not on file   Stress: Not on file   Social Connections: Not on file   Intimate Partner Violence: Not on file   Housing Stability: Not on file        PHYSICAL EXAM:  There were no vitals taken for this visit.        ASSESSMENT/PLAN:  Ms. Murphy is a 42 yo woman with PD c/b right foot dystonia who presents for follow-up of initial botulinum toxin injections.  They have not noticed any improvement following the injections but her functional impairments very much appear to be behavioral.    - Will try increasing the Botox dose for next injection      Diana Fink MD  Movement Disorders Fellow

## 2021-12-07 ENCOUNTER — VIRTUAL VISIT (OUTPATIENT)
Dept: NEUROLOGY | Facility: CLINIC | Age: 41
End: 2021-12-07
Payer: MEDICARE

## 2021-12-07 DIAGNOSIS — G20.A1 PARKINSON'S DISEASE (H): ICD-10-CM

## 2021-12-07 DIAGNOSIS — G24.8 FOCAL DYSTONIA: Primary | ICD-10-CM

## 2021-12-07 PROCEDURE — 99212 OFFICE O/P EST SF 10 MIN: CPT | Mod: 95

## 2021-12-07 NOTE — PROGRESS NOTES
Fiona is a 41 year old who is being evaluated via a billable video visit.      How would you like to obtain your AVS? MyChart  If the video visit is dropped, the invitation should be resent by: [phone]      Video-Visit Details    Type of service:  Video Visit    Video Start Time: 9:34 AM    Video End Time (time video stopped): 9:42 AM    Duration: 8 min    Originating Location (pt. Location): Home    Distant Location (provider location):  [facility] [place]     Platform used for Video Visit: [employer]

## 2022-01-06 ENCOUNTER — TELEPHONE (OUTPATIENT)
Dept: NEUROLOGY | Facility: CLINIC | Age: 42
End: 2022-01-06
Payer: MEDICARE

## 2022-01-06 NOTE — TELEPHONE ENCOUNTER
Writer left a message for the patient to call back to see she still wanted to be seen sooner since she is on the wait list. The patient was offered a sooner appointment of 1/7 at 8:30 AM or 2:45 PM.

## 2022-01-28 ENCOUNTER — TELEPHONE (OUTPATIENT)
Dept: NEUROLOGY | Facility: CLINIC | Age: 42
End: 2022-01-28
Payer: MEDICARE

## 2022-01-28 NOTE — TELEPHONE ENCOUNTER
Left the patient a detailed message asking if she would like to be scheduled sooner on 2/4 with Dr. Han at 11:30 AM or 3:15 and for the patient to call back soon if she would like these appointments.

## 2022-02-07 NOTE — PROGRESS NOTES
Movement Disorders Botulinum Toxin Clinic Note    Chief Complaint: right foot dystonia    History of Present Illness:  Fiona Murphy is a 41 year old female who presents to clinic for botulinum toxin injections for treatment of right foot dystonia 2/2 PD.    Response to Last Injection: 11/10/2021    Benefit from last injections: less cramping possibly.  At our follow-up appointment on 12/7/2021, there wasn't thought to be any improvement.    Side effects: none        Current Outpatient Medications   Medication Sig Dispense Refill     busPIRone (BUSPAR) 15 MG tablet Take 1/2 tab twice daily for 7 days then increase to 1 tab twice daily therafter.       divalproex sodium delayed-release (DEPAKOTE) 250 MG DR tablet Divalproex Sodium  mg 1 tab in the evening 30 tablet 0     divalproex sodium extended-release (DEPAKOTE ER) 500 MG 24 hr tablet TAKE 1 TABLET BY MOUTH EVERY MORNING,;TAKE 2 TABLETS (1000MG) BY MOUTH EVERY EVENING. 90 tablet 0     fish oil-omega-3 fatty acids 1000 MG capsule Take 2 g by mouth daily       folic acid (FOLVITE) 1 MG tablet        gabapentin (NEURONTIN) 300 MG capsule 300 mg 300mg in the morning  2 caps (600mg) at bed time       lithium ER (LITHOBID) 300 MG CR tablet 300 mg       magnesium oxide (MAG-OX) 400 MG tablet        medroxyPROGESTERone (DEPO-PROVERA) 150 MG/ML IM injection Inject 150 mg into the muscle       Multiple Vitamin (DAILY-SAVANNA) TABS        omeprazole (PRILOSEC) 40 MG DR capsule        QUEtiapine (SEROQUEL) 50 MG tablet Take 50 mg by mouth       RYTARY 36. MG CPCR per CR capsule        senna-docusate (SENOKOT-S/PERICOLACE) 8.6-50 MG tablet Take 1 tablet by mouth       vitamin D2 (ERGOCALCIFEROL) 76027 units (1250 mcg) capsule          Allergies: She has No Known Allergies.    No past medical history on file.    No past surgical history on file.    Social History     Socioeconomic History     Marital status: Single     Spouse name: Not on file     Number of children:  Not on file     Years of education: Not on file     Highest education level: Not on file   Occupational History     Not on file   Tobacco Use     Smoking status: Never Smoker     Smokeless tobacco: Never Used   Substance and Sexual Activity     Alcohol use: Not on file     Drug use: Not on file     Sexual activity: Not on file   Other Topics Concern     Not on file   Social History Narrative     Not on file     Social Determinants of Health     Financial Resource Strain: Not on file   Food Insecurity: Not on file   Transportation Needs: Not on file   Physical Activity: Not on file   Stress: Not on file   Social Connections: Not on file   Intimate Partner Violence: Not on file   Housing Stability: Not on file       No family history on file.    Physical Examination:  Vital Signs:   blood pressure is 137/82 and her pulse is 115. Her respiration is 16 and oxygen saturation is 98%.        BOTULINUM NEUROTOXIN INJECTION PROCEDURES:    VERIFICATION OF PATIENT IDENTIFICATION AND PROCEDURE     Initials   Patient Name acc   Patient  acc   Procedure Verified by: [facility]     Above assessments performed by:  Diana Fink MD      INDICATION/S FOR PROCEDURE/S:  Fiona Murphy is a 41 year old year old patient with dystonia affecting the  right lower extremity secondary to a diagnosis of Parkinson's disease with associated  pain, loss of volitional motor control and difficulty with ambulation and transfers.     Her baseline symptoms have been recalcitrant to oral medications and conservative therapy.  She is here today for an injection of Botox.      GOAL OF PROCEDURE:  The goal of this procedure is to improve volitional motor control, decrease pain  and enhance functional independence associated with dystonic movements.    TOTAL DOSE ADMINISTERED:  Dose Administered: 190 units Botox    Diluent Used:  Preservative Free Normal Saline  Total Volume of Diluent Used:  2 ml  NDC #: Botox 100u ([address]-00)    CONSENT:  The risks,  benefits, and treatment options were discussed with Fiona Murphy and she agreed to proceed.      Written consent was obtained by acc.     EQUIPMENT USED:  Needle-37mm stimulating/recording  EMG/NCS Machine    SKIN PREPARATION:  Skin preparation was performed using an alcohol wipe.    GUIDANCE DESCRIPTION:  Electro-myographic guidance was necessary throughout the procedure to accurately identify all areas of dystonic muscles while avoiding injection of non-dystonic muscles, neighboring nerves and nearby vascular structures.     AREA/MUSCLE INJECTED:      Visual display of locations injections are scanned into the chart under MEDIA tab.    Muscles Injected Units Injected Number of Injections   Right tibialis posterior 80 1   Right flexor digitorum longus 60 1   Right flexor digitorum brevis 50 1        Total Units Injected: 190    Unavoidable Waste: 10    Total Units Billed 200      The patient tolerated the injections without difficulty.      Assessment:    Fiona Murphy is a 41 year old female with right foot dystonia 2/2 PD.  Today we did initial botulinum toxin injections.    Plan  Follow-up in 3 months' time to consider repeat injections.

## 2022-02-08 ENCOUNTER — OFFICE VISIT (OUTPATIENT)
Dept: NEUROLOGY | Facility: CLINIC | Age: 42
End: 2022-02-08
Payer: MEDICARE

## 2022-02-08 ENCOUNTER — LAB REQUISITION (OUTPATIENT)
Dept: LAB | Facility: CLINIC | Age: 42
End: 2022-02-08

## 2022-02-08 VITALS
DIASTOLIC BLOOD PRESSURE: 82 MMHG | RESPIRATION RATE: 16 BRPM | HEART RATE: 115 BPM | SYSTOLIC BLOOD PRESSURE: 137 MMHG | OXYGEN SATURATION: 98 %

## 2022-02-08 DIAGNOSIS — G20.A1 PARKINSON'S DISEASE (H): Primary | ICD-10-CM

## 2022-02-08 DIAGNOSIS — Z77.21 EXPOSURE TO BLOOD OR BODY FLUID: Primary | ICD-10-CM

## 2022-02-08 DIAGNOSIS — G24.8 FOCAL DYSTONIA: ICD-10-CM

## 2022-02-08 PROBLEM — Q21.11 OSTIUM SECUNDUM ATRIAL SEPTAL DEFECT: Status: ACTIVE | Noted: 2021-02-26

## 2022-02-08 PROBLEM — F91.9 BEHAVIOR DISTURBANCE: Status: ACTIVE | Noted: 2021-07-10

## 2022-02-08 PROBLEM — G40.909 NONINTRACTABLE EPILEPSY WITHOUT STATUS EPILEPTICUS (H): Status: ACTIVE | Noted: 2018-10-23

## 2022-02-08 PROBLEM — B96.81 HELICOBACTER PYLORI GASTRITIS: Status: ACTIVE | Noted: 2021-07-08

## 2022-02-08 PROBLEM — I27.20 PULMONARY HYPERTENSION (H): Status: ACTIVE | Noted: 2021-02-26

## 2022-02-08 PROBLEM — K29.70 HELICOBACTER PYLORI GASTRITIS: Status: ACTIVE | Noted: 2021-07-08

## 2022-02-08 LAB
HBV SURFACE AG SERPL QL IA: NONREACTIVE
HIV 1+2 AB+HIV1 P24 AG SERPL QL IA: NONREACTIVE
HIV 1+2 AB+HIV1P24 AG SERPLBLD IA.RAPID: NON REACTIVE
HIV 1+2 AB+HIV1P24 AG SERPLBLD IA.RAPID: NON REACTIVE
HIV INTERPRETATION: NORMAL

## 2022-02-08 PROCEDURE — 87389 HIV-1 AG W/HIV-1&-2 AB AG IA: CPT | Performed by: INTERNAL MEDICINE

## 2022-02-08 PROCEDURE — 87522 HEPATITIS C REVRS TRNSCRPJ: CPT | Performed by: INTERNAL MEDICINE

## 2022-02-08 PROCEDURE — 87340 HEPATITIS B SURFACE AG IA: CPT | Performed by: INTERNAL MEDICINE

## 2022-02-08 PROCEDURE — 95874 GUIDE NERV DESTR NEEDLE EMG: CPT | Performed by: STUDENT IN AN ORGANIZED HEALTH CARE EDUCATION/TRAINING PROGRAM

## 2022-02-08 PROCEDURE — 64642 CHEMODENERV 1 EXTREMITY 1-4: CPT | Performed by: STUDENT IN AN ORGANIZED HEALTH CARE EDUCATION/TRAINING PROGRAM

## 2022-02-08 PROCEDURE — 87806 HIV AG W/HIV1&2 ANTB W/OPTIC: CPT | Performed by: INTERNAL MEDICINE

## 2022-02-08 ASSESSMENT — PAIN SCALES - GENERAL: PAINLEVEL: NO PAIN (1)

## 2022-02-10 LAB — HCV RNA SERPL NAA+PROBE-ACNC: NOT DETECTED IU/ML

## 2022-03-16 DIAGNOSIS — G20.A1 PARKINSON'S DISEASE (H): Primary | ICD-10-CM

## 2022-03-16 NOTE — TELEPHONE ENCOUNTER
Rx Authorization:  Requested Medication/ Dose: Rytary 36. MG Caps  Date last refill ordered: 5/1/21  Quantity ordered: 248 caps  # refills: 11  Date of last clinic visit with ordering provider: 2/8/22  Date of next clinic visit with ordering provider: F/U 3 months  All pertinent protocol data (lab date/result):   Include pertinent information from patients message:

## 2022-03-17 RX ORDER — LEVODOPA AND CARBIDOPA 145; 36.25 MG/1; MG/1
2 CAPSULE, EXTENDED RELEASE ORAL 4 TIMES DAILY
Qty: 240 CAPSULE | Refills: 11 | Status: SHIPPED | OUTPATIENT
Start: 2022-03-17 | End: 2022-05-20

## 2022-04-07 NOTE — PROGRESS NOTES
"Department of Neurology  Movement Disorders Division   Follow-up Note    Patient: Fiona Murphy   MRN: [id number]   : 1980   Date of Visit: 2022    Ms. Murphy is a 41 year old female who presents for follow-up of PD c/b right foot dystonia. She was last seen on 2022, at which time she underwent Botox injections in right foot. Today, she is accompanied by her mother.    INTERVAL EVENTS:  She has been struggling with her involuntary movements that she describes as \"shaking\". It makes her cry and angry and more anxious. It is also worse when she's anxious. She is moving her right extremities > left with dyskinetic appearance in clinic and states these are the bothersome movements that she's trying to describe. The movements happen during the day and they start in the morning. They sometimes make her fall out of the chair She uses a CPAP at night and does not have any movements at night.    She also notes shaking while eating and will sometimes throw food. She uses adaptive larger handle silverware which is helpful.    She is able to walk independently and wears an ankle brace on the right. She does have some problems with stairs but is able to do them. Her right foot occasionally curls in. She has had 2 Botox injections with Dr. Fink and there was some improvement but not significant. They are amenable to trying this again.    Related Medications  6am 9:30am 1pm 4:30pm   Rytary 145mg 2 2 2 2     ROS:  All others negative except as listed above.    No past medical history on file.     No past surgical history on file.     Current Outpatient Medications   Medication Sig Dispense Refill     busPIRone (BUSPAR) 15 MG tablet Take 1/2 tab twice daily for 7 days then increase to 1 tab twice daily therafter.       carbidopa-levodopa ER (RYTARY) 36. MG CPCR per CR capsule Take 2 capsules by mouth 4 times daily 240 capsule 11     divalproex sodium delayed-release (DEPAKOTE) 250 MG DR tablet Divalproex Sodium "  mg 1 tab in the evening 30 tablet 0     divalproex sodium extended-release (DEPAKOTE ER) 500 MG 24 hr tablet TAKE 1 TABLET BY MOUTH EVERY MORNING,;TAKE 2 TABLETS (1000MG) BY MOUTH EVERY EVENING. 90 tablet 0     fish oil-omega-3 fatty acids 1000 MG capsule Take 2 g by mouth daily       folic acid (FOLVITE) 1 MG tablet        gabapentin (NEURONTIN) 300 MG capsule 300 mg 300mg in the morning  2 caps (600mg) at bed time       lithium ER (LITHOBID) 300 MG CR tablet 300 mg       magnesium oxide (MAG-OX) 400 MG tablet        medroxyPROGESTERone (DEPO-PROVERA) 150 MG/ML IM injection Inject 150 mg into the muscle       Multiple Vitamin (DAILY-SAVANNA) TABS        omeprazole (PRILOSEC) 40 MG DR capsule        QUEtiapine (SEROQUEL) 50 MG tablet Take 50 mg by mouth       senna-docusate (SENOKOT-S/PERICOLACE) 8.6-50 MG tablet Take 1 tablet by mouth       vitamin D2 (ERGOCALCIFEROL) 47207 units (1250 mcg) capsule          No Known Allergies     No family history on file.     Social History     Socioeconomic History     Marital status: Single     Spouse name: Not on file     Number of children: Not on file     Years of education: Not on file     Highest education level: Not on file   Occupational History     Not on file   Tobacco Use     Smoking status: Never Smoker     Smokeless tobacco: Never Used   Substance and Sexual Activity     Alcohol use: Not on file     Drug use: Not on file     Sexual activity: Not on file   Other Topics Concern     Not on file   Social History Narrative     Not on file     Social Determinants of Health     Financial Resource Strain: Not on file   Food Insecurity: Not on file   Transportation Needs: Not on file   Physical Activity: Not on file   Stress: Not on file   Social Connections: Not on file   Intimate Partner Violence: Not on file   Housing Stability: Not on file        PHYSICAL EXAM:  /64   Pulse 88   Resp 16   SpO2 96%      Frankly dyskinetic in bilateral head/neck, mouth, trunk  and extremities to the point of nearly sliding out of chair multiple times. No global bradykinesia, decreased blink rate. R foot in AFO.    DATA REVIEWED:  DATscan 9/21/2021  Impression:  1. A presynaptic dopaminergic deficit is present, left greater than  right.    ASSESSMENT:  Ms. Murphy is a 41 year old female who presents for follow-up of PD c/b right foot dystonia. She has been struggling with involuntary movements, which appear dyskinetic in nature. Additionally they do not occur at night like previous presumed functional movements. Will plan for careful taper of Rytary and ask her facility to update us with weekly medication changes in regards to dyskinesias or other parkinsonian symptoms. During today's visit, we spoke with Clarita Cohnd from her facility and she is amenable to this plan.     PLAN:  - slowly decrease Rytary, see AVS  - facility to update after 1 week, consider further decrease at that time  - encouraged her to contact Botox injections as she has noticed some benefit    RTC in 2 months    Patient and plan was examined & discussed with attending physician, Dr. Han.    Dottie Sandhu MD  Movement Disorders Fellow     Patient seen and examined with Dr. Sandhu and I agree with the assessment and plan as outlined.  Time this date with patient, conferring with mother and caregiver, reviewing records, and documenting, 1h.    Tonny Han PhD, MD

## 2022-04-08 ENCOUNTER — OFFICE VISIT (OUTPATIENT)
Dept: NEUROLOGY | Facility: CLINIC | Age: 42
End: 2022-04-08
Payer: MEDICARE

## 2022-04-08 VITALS
HEART RATE: 88 BPM | SYSTOLIC BLOOD PRESSURE: 117 MMHG | RESPIRATION RATE: 16 BRPM | OXYGEN SATURATION: 96 % | DIASTOLIC BLOOD PRESSURE: 64 MMHG

## 2022-04-08 DIAGNOSIS — G20.A1 PARKINSON'S DISEASE (H): Primary | ICD-10-CM

## 2022-04-08 PROCEDURE — 99215 OFFICE O/P EST HI 40 MIN: CPT | Mod: GC | Performed by: PSYCHIATRY & NEUROLOGY

## 2022-04-08 ASSESSMENT — PAIN SCALES - GENERAL: PAINLEVEL: NO PAIN (0)

## 2022-04-08 NOTE — PATIENT INSTRUCTIONS
Currently you are takinam 9:30am 1pm 4:30pm   Rytary 145mg 2 2 2 2       We will slowly decrease your medication each week and observe for any changes in your dyskinesias. We ask that you call and report to us every week after a change. Our number is [phone].    Week 1:   6am 9:30am 1pm 4:30pm   Rytary 145mg 1 2 2 2   Rytary 95 mg 1        Call after one week.    After your update, we may continue to repeat this with the other doses. Please see the example below. However please do not make any additional changes without talking to us first!    Week 2:   6am 9:30am 1pm 4:30pm   Rytary 145mg 1 2 1 2   Rytary 95 mg 1  1      ______________________________________________________________________________    Think about whether you would like to continue the Botox injections in your right leg. It is encouraging that you noticed some improvement. It may be that you need a larger dose.

## 2022-04-08 NOTE — NURSING NOTE
Chief Complaint   Patient presents with     RECHECK     RETURN MOVEMENT DISORDER - f/u to [facility] scan     Uriah Lopez

## 2022-04-11 ENCOUNTER — TELEPHONE (OUTPATIENT)
Dept: NEUROLOGY | Facility: CLINIC | Age: 42
End: 2022-04-11
Payer: MEDICARE

## 2022-04-11 NOTE — TELEPHONE ENCOUNTER
[facility] Call Center    Phone Message    May a detailed message be left on voicemail: yes     Reason for Call: Other: Clarita calling requesting a return call to discuss medication orders recieved and to make sure she understands them. Please call for clarification thank you.      Action Taken: Message routed to:  [facility] neurology    Travel Screening: Not Applicable

## 2022-04-11 NOTE — TELEPHONE ENCOUNTER
"Went through the Rytary medication changes with Clarita. I will also fax the the chart Dr. Sandhu made. She asks if this is to help with the shaking. Discussed what dyskinesia's and that it will help with those. It looks like they have been referring to it as \"shaking\".    Fax: [phone]    "

## 2022-04-22 ENCOUNTER — TELEPHONE (OUTPATIENT)
Dept: NEUROLOGY | Facility: CLINIC | Age: 42
End: 2022-04-22
Payer: MEDICARE

## 2022-04-22 ENCOUNTER — PATIENT OUTREACH (OUTPATIENT)
Dept: NEUROLOGY | Facility: CLINIC | Age: 42
End: 2022-04-22
Payer: MEDICARE

## 2022-04-22 NOTE — TELEPHONE ENCOUNTER
[facility] Call Center     Phone Message     May a detailed message be left on voicemail: yes      Reason for Call: Other: Britt calling in returning a missed call from Anna she would liekto inform that she no longer works for [employer] and the correct number to call would be [phone]      Action Taken: Message routed to:  [facility] neurology     Travel Screening: Not Applicable

## 2022-04-22 NOTE — PROGRESS NOTES
Spoke to Clarita at [facility]. They have not started the medication changes. The pharmacy said they sent over additional forms for us to complete to get the changes authorized. She is not sure exactly what they need. I will call the pharmacy and see if it is a PA that they need.    Divina needs prior authorization for both formulations of Rytary. She faxed us the PA request on 4/21 (Thursday) and received a confirmation that it went through. She will fax it again.

## 2022-04-22 NOTE — PROGRESS NOTES
[facility] Call [facility]    Phone Message    May a detailed message be left on voicemail: yes     Reason for Call: Other: Britt calling in returning a missed call from Anna she would liekto inform that she no longer works for [employer] and the correct number to call would be [phone]     Action Taken: Message routed to:  [facility] neurology    Travel Screening: Not Applicable

## 2022-04-25 ENCOUNTER — TELEPHONE (OUTPATIENT)
Dept: NEUROLOGY | Facility: CLINIC | Age: 42
End: 2022-04-25
Payer: MEDICARE

## 2022-04-25 NOTE — PROGRESS NOTES
Spoke to Clarita at [facility]. They have not started the medication changes. The pharmacy said they sent over additional forms for us to complete to get the changes authorized. She is not sure exactly what they need. I will call the pharmacy and see if it is a PA that they need.    Divina needs prior authorization for both formulations of Rytary. She faxed us the PA request on 4/21 (Thursday) and received a confirmation that it went through. She will fax it again. Message sent to Naima to submit PA requests.

## 2022-04-25 NOTE — TELEPHONE ENCOUNTER
Prior Authorization Retail Medication Request    Medication/Dose: Rytary 36.25-145MG CPCR  ICD code: G20  Previously Tried and Failed:    Rationale:      Insurance Name: MEDICARE  Insurance ID: [id number]         Pharmacy Information  Name: [facility] - [place], [place] - [address]  Phone: [phone]

## 2022-04-25 NOTE — TELEPHONE ENCOUNTER
Prior Authorization Retail Medication Request    Medication/Dose: Rytary 23.75-95MG CPCR  ICD code:  G20  Previously Tried and Failed:    Rationale:     Insurance Name: MEDICARE   Insurance ID: [id number]       Pharmacy Information   Name: [facility] - [place], [place] - [address]  Phone: [phone]

## 2022-04-26 NOTE — TELEPHONE ENCOUNTER
Central Prior Authorization Team   Phone: [phone]      PA Initiation     Medication: Rytary 23.75-95MG  Insurance Company: Silver [employer] Part D - Phone [phone] Fax [phone]  Pharmacy Filling the Rx: [facility] - [place], [place] - [address]  Filling Pharmacy Phone: [phone]  Filling Pharmacy Fax:    Start Date: 4/26/2022

## 2022-04-26 NOTE — TELEPHONE ENCOUNTER
Prior Authorization Approval    Authorization Effective Date: 1/1/2022  Authorization Expiration Date: 4/26/2023  Medication: Rytary 36.25-145MG CPCR  Approved Dose/Quantity: 240 per 30 days  Reference #:     Insurance Company: Silver Script Part D - Phone [phone] Fax [phone]  Expected CoPay:       Which Pharmacy is filling the prescription (Not needed for infusion/clinic administered): [facility] - [place], [place] - [address]  Pharmacy Notified: Yes - spoke to [facility]  Patient Notified: No

## 2022-04-26 NOTE — TELEPHONE ENCOUNTER
Insurance put both strengths of the Rytary on one case (see encounter for Rytary 23.75/95mg)          PA Initiation    Medication: Rytary 36.25-145MG CPCR  Insurance Company: Silver [employer] Part D - Phone [phone] Fax [phone]  Pharmacy Filling the Rx: [facility] - [place], [place] - [address]  Filling Pharmacy Phone: [phone]  Filling Pharmacy Fax:    Start Date: 4/26/2022

## 2022-04-26 NOTE — TELEPHONE ENCOUNTER
Prior Authorization Approval    Authorization Effective Date: 1/1/2022  Authorization Expiration Date: 4/26/2023  Medication: Rytary 23.75-95MG  Approved Dose/Quantity: 90 per 30 days  Reference #:     Insurance Company: Silver Script Part D - Phone [phone] Fax [phone]  Expected CoPay:       Which Pharmacy is filling the prescription (Not needed for infusion/clinic administered): [facility] - [place], [place] - [address]  Pharmacy Notified: Yes - spoke to Dinora  Patient Notified: No

## 2022-05-09 ENCOUNTER — TELEPHONE (OUTPATIENT)
Dept: NEUROLOGY | Facility: CLINIC | Age: 42
End: 2022-05-09

## 2022-05-09 NOTE — TELEPHONE ENCOUNTER
Currently you are takinam 9:30am 1pm 4:30pm   Rytary 145mg 2 2 2 2         We will slowly decrease your medication each week and observe for any changes in your dyskinesias. We ask that you call and report to us every week after a change. Our number is [phone].     Week 1:    6am 9:30am 1pm 4:30pm   Rytary 145mg 1 2 2 2   Rytary 95 mg 1            Call after one week.     After your update, we may continue to repeat this with the other doses. Please see the example below. However please do not make any additional changes without talking to us first!     Week 2:    6am 9:30am 1pm 4:30pm   Rytary 145mg 1 2 1 2   Rytary 95 mg 1   1

## 2022-05-11 ENCOUNTER — TELEPHONE (OUTPATIENT)
Dept: NEUROLOGY | Facility: CLINIC | Age: 42
End: 2022-05-11
Payer: MEDICARE

## 2022-05-11 NOTE — TELEPHONE ENCOUNTER
[facility] Call [facility]    Phone Message    May a detailed message be left on voicemail: yes     Reason for Call: Other: Iza from [facility] called and stated that she would like medication instructions sent for the new prescription of carbidopa-levodopa ER (RYTARY). Please fax information to [phone]     Action Taken: Message routed to:  [facility]    Travel Screening: Not Applicable

## 2022-05-11 NOTE — TELEPHONE ENCOUNTER
On 5/7  she went to take Rytary using the following table:    6am 9:30am 1pm 4:30pm   Rytary 145mg 1 2 2 2   Rytary 95 mg 1            Iza needs new orders for this and asked if more changes will be made. Informed her they are to update us after being on this regimen for 1 week and let us know if her extra movements have improved. She will call on Monday and let us know. At that time we will send new orders as further changes may be made by Dr. Sandhu.

## 2022-05-16 ENCOUNTER — TELEPHONE (OUTPATIENT)
Dept: NEUROLOGY | Facility: CLINIC | Age: 42
End: 2022-05-16
Payer: MEDICARE

## 2022-05-16 DIAGNOSIS — G20.A1 PARKINSON'S DISEASE (H): ICD-10-CM

## 2022-05-16 NOTE — TELEPHONE ENCOUNTER
[facility] Call [facility]    Phone Message    May a detailed message be left on voicemail: yes     Reason for Call: Other: Iza the program administator is calling to re[ort how the patient is reacting to the medication.    Please call Iza back at #[phone]    Action Taken: Message routed to:  [facility] Neurology    Travel Screening: Not Applicable

## 2022-05-17 NOTE — TELEPHONE ENCOUNTER
Atttempted call back, left message on voicemail that I will attempt another call later this afternoon.

## 2022-05-18 NOTE — TELEPHONE ENCOUNTER
[facility]    Phone Message    May a detailed message be left on voicemail: yes     Reason for Call: Other: Iza calling due to a missed call from Dottie yesterday. She is needing a call back to discuss Fiona's rytary. Please call Iza at [phone] to discuss at your earliest convenience.     Action Taken: Message routed to:  [facility]    Travel Screening: Not Applicable

## 2022-05-20 RX ORDER — LEVODOPA AND CARBIDOPA 145; 36.25 MG/1; MG/1
CAPSULE, EXTENDED RELEASE ORAL
Qty: 210 CAPSULE | Refills: 11 | Status: SHIPPED | OUTPATIENT
Start: 2022-05-20 | End: 2022-07-14

## 2022-05-20 NOTE — TELEPHONE ENCOUNTER
I spoke with Iza. She feels like the movements while largely unchanged with the recent decrease are not bothersome to the patient. Notably, she has not been falling out of her chair from the movements since the decrease.    As the movements are tolerable, will not make any further medication changes and I will send a refill for the prescription to her pharmacy.      6am 9:30am 1pm 4:30pm   Rytary 145mg 1 2 2 2   Rytary 95 mg 1             Additionally, will fax the order to Iza at [phone].

## 2022-05-20 NOTE — TELEPHONE ENCOUNTER
----- Message from Anna Whitman RN sent at 5/20/2022  2:24 PM CDT -----  Regarding: RE: Rytary  I am not in clinic today. Naima can you fax these?  ----- Message -----  From: Dottie Sandhu MD  Sent: 5/20/2022   2:21 PM CDT  To: Anna Whitman RN  Subject: Rytary                                           Ac Castillo,    Would you be able to fax the prescriptions for Rytary 95 mg and 145 mg capsules to Iza at [phone]? She said while preferable to receive this today, as long as it has today's date as a start day it's fine if it's not sent until Monday. I just sent the new scripts to the pharmacy as well.    Thanks!  Dottie

## 2022-06-28 ENCOUNTER — MEDICAL CORRESPONDENCE (OUTPATIENT)
Dept: HEALTH INFORMATION MANAGEMENT | Facility: CLINIC | Age: 42
End: 2022-06-28

## 2022-06-29 NOTE — PROGRESS NOTES
"    42 yo F with, reportedly, intellectual disability, major depressive disorder, generalized anxiety disorder, intermittent explosive disorder, followed by us for Parkinson's disease.    Problems we've faced have been foot dystonia and involuntary movements which.  Some of these, particularly nocturnal ones, when she was without levodopa, have seemed behavioral/psychiatric, rather than Parkinsonian, so much so that we obtained a Naveed SPECT which did support the diagnosis of PD.  Most recently (office visit of Apr08) she had fairly classic Parkinsonian dyskinesias (levodopa-induced dyskinesias, LID) in the office and said that these were the movements (\"shaking\") that were bothering her, and they were reportedly functionally impairing, at times, i.e. causing her to slide out of a chair, or interfering with feeding herself.    We decided to reduce total daily levodopa, cautiously, with close feedback from group home staff.    Since last visit, with the first two steps, a fairly small reduction, staff reported movements unchanged, but not falling out of chair, and seemed unbothered by the movements.  No further medication change made at that point.    Actual meds   6a 9:30a 1p 4:30p   Rytary 145 1 2 2 2   Rytary 95 1        Depakote 500mgqAM  1250qPM  Quetiapine 50 mg at bedtime  Buspar 30 mg bid  Lithium: 300 mg 8pm    Here with two staff from [facility]  She c/o head and R arm \"shaking\" with breakfast  Staff says R arm \"shaking\" is with most tasks, not only breakfast, e.g. putting on shoes.    Staff are able to distinguish two kinds of shaking: one is dyskinesias (she was having them when I asked, so no difficulty being sure we were talking about the same thing) and the other is a hand tremor (mimed by one of the staff).    The tremor-shaking is typically around 8a in the morning.  The LID-shaking is \"all day\"  The hand shaking of which Ms. Murphy herself complains is with eating breakfast, which is 6-7am, shortly " after her first dose.  She's not had LID-shaking severe enough to interfere with her ability to sit in a chair.    UPDRS Values 2022   Time: 8:59 AM   Medication On   R Brain DBS: None   L Brain DBS: None   Dyskinesia (LID) Yes   Speech 0   Facial Expression 0   Rigidity Neck 2   Rigidity RUE 2   Rigidity LUE 1   Rigidity RLE 2   Rigidity LLE 2   Finger Taps R 1   Finger Taps L 1   Hand Mvt R 0   Hand Mvt L 0   Pron-/Supinate R 0   Pron-/Supinate L 1   Toe Tap R (No Data)   Toe Tap L 2   Leg Agility R 0   Leg Agility L 0   Arise From Chair 0   Gait 0   Gait Freezing 0   Postural Stability 1   Posture 0   Global Spont Mvt 0   Postural Tremor RUE 2   Postural Tremor LUE 2   Kinetic Tremor RUE 1   Kinetic Tremor LUE 2   Rest Tremor RUE 1   Rest Tremor LUE 1   Rest Tremor RLE 0   Rest Tremor LLE 0   Rest Tremor Lip/Jaw 0   Rest Tremor Constancy 1   Total Left 12   Axial Total 3       During the appointment, she transitioned, from about 9am to 9:20, from dyskinetic to OFF, with not only change of LID to tremor, but malso a more parkinsonian gait and posture.    Plan: We'll start by reducing 6am Rytary, in hopes of reducing breakfast dyskinesias, by reducing short-acting levodopa. But this will also reduce long acting, which will probably worsen the 8-9am OFF which I witnessed today.  So we'll add entacapone to that first dose.  If this succeeds in the morning, we can try the same strategy with later dose times, although not clear if dyskinesias are as much trouble at other times of day.  Amantadine's another possibility.    Time this date with patient, reviewing records, and documentinh    Tonny Han PhD, Md

## 2022-07-01 ENCOUNTER — OFFICE VISIT (OUTPATIENT)
Dept: NEUROLOGY | Facility: CLINIC | Age: 42
End: 2022-07-01
Payer: MEDICARE

## 2022-07-01 VITALS — SYSTOLIC BLOOD PRESSURE: 104 MMHG | HEART RATE: 109 BPM | DIASTOLIC BLOOD PRESSURE: 62 MMHG | OXYGEN SATURATION: 96 %

## 2022-07-01 DIAGNOSIS — G20.A1 PARKINSON'S DISEASE (H): Primary | ICD-10-CM

## 2022-07-01 PROCEDURE — 99215 OFFICE O/P EST HI 40 MIN: CPT | Performed by: PSYCHIATRY & NEUROLOGY

## 2022-07-01 RX ORDER — BUSPIRONE HYDROCHLORIDE 30 MG/1
30 TABLET ORAL 2 TIMES DAILY
COMMUNITY
Start: 2022-06-28 | End: 2024-04-26

## 2022-07-01 RX ORDER — ENTACAPONE 200 MG/1
TABLET ORAL
Qty: 30 TABLET | Refills: 1 | Status: SHIPPED | OUTPATIENT
Start: 2022-07-01 | End: 2022-09-07

## 2022-07-01 ASSESSMENT — UNIFIED PARKINSONS DISEASE RATING SCALE (UPDRS)
PRONATION_SUPINATION_RIGHT: NORMAL
POSTURE: 0 NORMAL, NO PROBLEMS
HANDMOVEMENTS_RIGHT: NORMAL
RIGIDITY_NECK: MILD: RIGIDITY DETECTED WITHOUT THE ACTIVATION MANEUVER.  FULL RANGE OF MOTION IS EASILY ACHIEVED.
LEG_AGILITY_LEFT: NORMAL
RIGIDITY_RUE: MILD: RIGIDITY DETECTED WITHOUT THE ACTIVATION MANEUVER.  FULL RANGE OF MOTION IS EASILY ACHIEVED.
FACIAL_EXPRESSION: NORMAL.
AMPLITUDE_LUE: SLIGHT: < 1 CM IN MAXIMAL AMPLITUDE.
FREEZING_GAIT: NORMAL
TOETAPPING_LEFT: MILD: ANY OF THE FOLLOWING: A) 3 TO 5 INTERRUPTIONS DURING TAPPING B) MILD SLOWING C) THE AMPLITUDE DECREMENTS MIDWAY IN THE 10-MOVEMENT SEQUENCE
AXIAL_SCORE: 3
DYSKINESIAS_PRESENT: YES
FINGER_TAPPING_LEFT: SLIGHT: ANY OF THE FOLLOWING: A) THE REGULAR RHYTHM IS BROKEN WITH ONE WITH ONE OR TWO INTERRUPTIONS OR HESITATIONS OF THE MOVEMENT B) SLIGHT SLOWING C) THE AMPLITUDE DECREMENTS NEAR THE END OF THE 10 MOVEMENTS.
GAIT: NORMAL
POSTURAL_STABILITY: SLIGHT: 3-5 STEPS, BUT RECOVERS UNAIDED.
LEG_AGILITY_RIGHT: NORMAL
TOTAL_SCORE_LEFT: 12
RIGIDITY_LUE: SLIGHT: RIGIDITY ONLY DETECTED WITH ACTIVATION MANEUVER.
SPONTANEITY_OF_MOVEMENT: 0: NORMAL.  NO PROBLEMS.
AMPLITUDE_RLE: NORMAL: NO TREMOR.
CONSTANCY_TREMOR_ATREST: SLIGHT: TREMOR AT REST IS PRESENT  25% OF THE ENTIRE EXAMINATION PERIOD.
RIGIDITY_RLE: MILD: RIGIDITY DETECTED WITHOUT THE ACTIVATION MANEUVER.  FULL RANGE OF MOTION IS EASILY ACHIEVED.
AMPLITUDE_LLE: NORMAL: NO TREMOR.
PRONATION_SUPINATION_LEFT: SLIGHT: ANY OF THE FOLLOWING: A) THE REGULAR RHYTHM IS BROKEN WITH ONE WITH ONE OR TWO INTERRUPTIONS OR HESITATIONS OF THE MOVEMENT B) SLIGHT SLOWING C) THE AMPLITUDE DECREMENTS NEAR THE END OF THE 10 MOVEMENTS.
SPEECH: NORMAL
AMPLITUDE_RUE: SLIGHT: < 1 CM IN MAXIMAL AMPLITUDE.
ARISING_CHAIR: NORMAL: ABLE TO ARISE QUICKLY WITHOUT HESITATION.
AMPLITUDE_LIP_JAW: NORMAL: NO TREMOR.
FINGER_TAPPING_RIGHT: SLIGHT: ANY OF THE FOLLOWING: A) THE REGULAR RHYTHM IS BROKEN WITH ONE WITH ONE OR TWO INTERRUPTIONS OR HESITATIONS OF THE MOVEMENT B) SLIGHT SLOWING C) THE AMPLITUDE DECREMENTS NEAR THE END OF THE 10 MOVEMENTS.
HANDMOVEMENTS_LEFT: NORMAL
RIGIDITY_LLE: MILD: RIGIDITY DETECTED WITHOUT THE ACTIVATION MANEUVER.  FULL RANGE OF MOTION IS EASILY ACHIEVED.
PARKINSONS_MEDS: ON

## 2022-07-01 NOTE — PATIENT INSTRUCTIONS
Current Parkinson's medications   6a 9:30a 1p 4:30p   Rytary 145 1 2 2 2   Rytary 95 1        New medication Comtan (entacapone) 200 mg    First one to two WEEKS   6a 9:30a 1p 4:30p   Rytary 145 0 2 2 2   Rytary 95 2 0 0 0   Comtan 200 mg 1 0 0 0     After 1-2 weeks, please report results to us via [email or web address] (or telephone, but [employer] works better).  In particular, how are the dyskinesias at breakfast (around 7am), and how is the tremor around 8am?  Depending on results with this initial medication change, I will probably make further steps.

## 2022-07-12 ENCOUNTER — TELEPHONE (OUTPATIENT)
Dept: NEUROLOGY | Facility: CLINIC | Age: 42
End: 2022-07-12

## 2022-07-12 DIAGNOSIS — G20.A1 PARKINSON'S DISEASE (H): Primary | ICD-10-CM

## 2022-07-12 NOTE — TELEPHONE ENCOUNTER
[facility] Call Center    Phone Message    May a detailed message be left on voicemail: yes     Reason for Call: Other:  called to say she has notice increase in dyskinesia in patient since taking the entacapone about 2 weeks ago. Please contact [facility] manager to advise at [phone].    Action Taken: Message routed to:  [facility] neurology    Travel Screening: Not Applicable

## 2022-07-13 NOTE — TELEPHONE ENCOUNTER
Fiona was sick for about 1 week after her appointment with Dr. Han and spent a lot of time in bed. She was better over the last weekend and nausea, vomiting, and diarrhea has resolved. Her dyskinesia's are worse than they were at the visit on July 1st.    Dyskinesia:  Unclear when it starts in the morning but it gets worse until mid-day (mid is most severe) and then tapers off in the evening but is present pretty much all the time. Her arms are constantly rolling and she cannot be still during the morning and midday. It interferes with eating every meal. She sleeps through the night but they don't know if it is present at night because she does not get up.    Tremors: are unchanged.    Mid-day dyskinesia is the worse. Cannot feed herself  Started on Saturday

## 2022-07-14 RX ORDER — LEVODOPA AND CARBIDOPA 145; 36.25 MG/1; MG/1
CAPSULE, EXTENDED RELEASE ORAL
Qty: 120 CAPSULE | Refills: 11 | Status: SHIPPED | OUTPATIENT
Start: 2022-07-14 | End: 2022-12-16

## 2022-07-19 ENCOUNTER — TELEPHONE (OUTPATIENT)
Dept: NEUROLOGY | Facility: CLINIC | Age: 42
End: 2022-07-19

## 2022-07-19 NOTE — TELEPHONE ENCOUNTER
Dr. Han is not prescribing or managing Fiona's seizure medicaitons. They will need to contact Dr. Yumi Ramirez for refills. Phone: [phone]

## 2022-07-19 NOTE — TELEPHONE ENCOUNTER
[facility] Call [facility]    Phone Message    May a detailed message be left on voicemail: yes     Reason for Call: Other: She missed a call, please call Ignacia after 2:30 today.     Action Taken: Message routed to:  [facility] neurology    Travel Screening: Not Applicable

## 2022-07-19 NOTE — TELEPHONE ENCOUNTER
[facility] Call Center    Phone Message    May a detailed message be left on voicemail: yes     Reason for Call: Medication Refill Request    Has the patient contacted the pharmacy for the refill? Yes   [facility]  Provider who prescribed the medication: Dr.Scott Han  Pharmacy: [facility] [address]  Date medication is needed:     Ignacia  called, requesting for new prescription order be [facility]ed [facility]. Patient is new to facility, Ignacia is unable to provide more information to writer. [phone] Please speak to Ignacia or to manager Patt.   Direct number for Ignacia is  [phone].    Action Taken: Message routed to:  [facility] neurology    Travel Screening: Not Applicable

## 2022-07-19 NOTE — TELEPHONE ENCOUNTER
[facility] Call [facility]    Phone Message    May a detailed message be left on voicemail: yes     Reason for Call: Other: For the Depakote please refill 500 mg and dosage for:   TAKE 1 TABLET BY MOUTH EVERY MORNING,;TAKE 2 TABLETS (1000MG) BY MOUTH EVERY EVENING      Action Taken: Message routed to:  [facility] neurology    Travel Screening: Not Applicable

## 2022-07-19 NOTE — TELEPHONE ENCOUNTER
Left a message for Ignacia that the depakote prescriptions she requested need to go through Dr. Yumi Ramirez for refills. Phone: [phone].

## 2022-08-02 NOTE — TELEPHONE ENCOUNTER
[facility] Call Center    Phone Message    May a detailed message be left on voicemail: yes     Reason for Call:  ADELFO Reilly from [facility] called to speak to care team regarding patients medication. Please call Melba at [phone] front dest phone number please ask for ANGEL Reilly     Hours   8/2 Tuesday 8: 00-5 pm  8/3 Wednesday  8:00 -3 pm    Action Taken: Message routed to:  [facility] eh adair;regina    Travel Screening: Not Applicable

## 2022-08-03 ENCOUNTER — TELEPHONE (OUTPATIENT)
Dept: NEUROLOGY | Facility: CLINIC | Age: 42
End: 2022-08-03

## 2022-08-03 DIAGNOSIS — G40.909 SEIZURE DISORDER (H): Primary | ICD-10-CM

## 2022-08-03 NOTE — TELEPHONE ENCOUNTER
Melba is called regarding refills for depakote. Dr. Eason has moved his practice and is too far for Fiona to see, she asks if Dr. Han with take over the prescription. Her new PCP Dr. Schmidt sent a 30 day supply but will not continue to prescribed. Informed her Dr. Han will not take over this prescription. She will need to establish care with another epilepsy Neurologist. I will ask Dr. Han to place a referral and advise her group home coordinator to schedule an appointment.

## 2022-08-04 ENCOUNTER — TELEPHONE (OUTPATIENT)
Dept: NEUROLOGY | Facility: CLINIC | Age: 42
End: 2022-08-04

## 2022-08-04 NOTE — TELEPHONE ENCOUNTER
What is the concern that needs to be addressed by a nurse? New mincep intake     May a detailed message be left on voicemail? yes    Date of last office visit: na    Message routed to: [facility] new referrals

## 2022-08-04 NOTE — TELEPHONE ENCOUNTER
Spoke to Venancio . She will assist Fiona with scheduling an epilepsy appointment, scheduling number given. If she cannot get in within 30 days Venancio will call us back and let us know if they need us to prescribe a temporary supply to last until her appointment. Per Dr. Han he can temporarily prescribe depakote to last until she is seen.

## 2022-09-06 DIAGNOSIS — G20.A1 PARKINSON'S DISEASE (H): ICD-10-CM

## 2022-09-06 NOTE — TELEPHONE ENCOUNTER
Rx Authorization:  Requested Medication/ Dose entacapone (COMTAN) 200 MG tablet  Date last refill ordered: 7/1/22  Quantity ordered: 30  # refills: 7  Date of last clinic visit with ordering provider: 7/1/22  Date of next clinic visit with ordering provider:   All pertinent protocol data (lab date/result):   Include pertinent information from patients message:

## 2022-09-07 RX ORDER — ENTACAPONE 200 MG/1
TABLET ORAL
Qty: 31 TABLET | Refills: 11 | Status: SHIPPED | OUTPATIENT
Start: 2022-09-07 | End: 2023-10-25

## 2022-09-15 ENCOUNTER — OFFICE VISIT (OUTPATIENT)
Dept: OPTOMETRY | Facility: CLINIC | Age: 42
End: 2022-09-15
Payer: MEDICARE

## 2022-09-15 DIAGNOSIS — H50.15 ALTERNATING EXOTROPIA: ICD-10-CM

## 2022-09-15 DIAGNOSIS — H55.01 NYSTAGMUS, CONGENITAL: ICD-10-CM

## 2022-09-15 DIAGNOSIS — H52.4 PRESBYOPIA: ICD-10-CM

## 2022-09-15 DIAGNOSIS — H52.03 HYPEROPIA OF BOTH EYES: Primary | ICD-10-CM

## 2022-09-15 PROCEDURE — 92004 COMPRE OPH EXAM NEW PT 1/>: CPT | Performed by: OPTOMETRIST

## 2022-09-15 PROCEDURE — 92015 DETERMINE REFRACTIVE STATE: CPT | Mod: GY | Performed by: OPTOMETRIST

## 2022-09-15 ASSESSMENT — REFRACTION_WEARINGRX
OD_ADD: +2.50
OS_ADD: +2.50
OD_SPHERE: +5.50
OS_CYLINDER: +0.50
OD_AXIS: 171
SPECS_TYPE: BIFOCAL
OS_SPHERE: +4.00
OD_CYLINDER: +0.75

## 2022-09-15 ASSESSMENT — REFRACTION_MANIFEST
OS_SPHERE: +4.00
OS_ADD: +1.75
OS_AXIS: 172
METHOD_AUTOREFRACTION: 1
OD_AXIS: 180
OD_SPHERE: +6.00
OS_AXIS: 173
OS_SPHERE: +3.50
OD_SPHERE: +5.50
OD_CYLINDER: +1.00
OD_CYLINDER: +1.00
OS_CYLINDER: +1.00
OD_AXIS: 172
OS_CYLINDER: +1.00
OD_ADD: +1.75

## 2022-09-15 ASSESSMENT — VISUAL ACUITY
OS_SC: 20/200?
METHOD: SNELLEN - LINEAR
OS_SC: 20/100
OD_SC: 20/200

## 2022-09-15 ASSESSMENT — TONOMETRY: IOP_METHOD: BOTH EYES NORMAL BY PALPATION

## 2022-09-15 ASSESSMENT — CONF VISUAL FIELD
OS_NORMAL: 1
METHOD: COUNTING FINGERS
OD_NORMAL: 1

## 2022-09-15 ASSESSMENT — CUP TO DISC RATIO
OS_RATIO: 0.3
OD_RATIO: 0.4

## 2022-09-15 ASSESSMENT — EXTERNAL EXAM - RIGHT EYE: OD_EXAM: NORMAL

## 2022-09-15 ASSESSMENT — SLIT LAMP EXAM - LIDS
COMMENTS: NORMAL
COMMENTS: NORMAL

## 2022-09-15 ASSESSMENT — EXTERNAL EXAM - LEFT EYE: OS_EXAM: NORMAL

## 2022-09-15 NOTE — LETTER
9/15/2022         RE: Fiona Murphy  [address] [place] [address]        Dear Colleague,    Thank you for referring your patient, [facility]iona Murphy[facility]. Please see a copy of my visit note below.    Chief Complaint   Patient presents with     Annual Eye Exam      Accompanied by staff member   Broken glasses   Last Eye Exam: no idea when but knows she has had one   Dilated Previously: Yes, side effects of dilation explained today    What are you currently using to see?  does not use glasses or contacts       Distance Vision Acuity: Noticed gradual change in both eyes    Near Vision Acuity: Not satisfied     Eye Comfort: itchy  Do you use eye drops? : No      Aleksandra Jett - Optometric Assistant           Medical, surgical and family histories reviewed and updated 9/15/2022.       OBJECTIVE: See Ophthalmology exam    ASSESSMENT:    ICD-10-CM    1. Hyperopia of both eyes  H52.03 EYE EXAM (SIMPLE-NONBILLABLE)     REFRACTION   2. Presbyopia  H52.4    3. Nystagmus, congenital  H55.01 EYE EXAM (SIMPLE-NONBILLABLE)   4. Alternating exotropia  H50.15 EYE EXAM (SIMPLE-NONBILLABLE)       PLAN:   Update prescription     Nataliia Fry OD         Again, thank you for allowing me to participate in the care of your patient.        Sincerely,        Nataliia Fry, OD

## 2022-09-15 NOTE — PROGRESS NOTES
Chief Complaint   Patient presents with     Annual Eye Exam      Accompanied by staff member   Broken glasses   Last Eye Exam: no idea when but knows she has had one   Dilated Previously: Yes, side effects of dilation explained today    What are you currently using to see?  does not use glasses or contacts       Distance Vision Acuity: Noticed gradual change in both eyes    Near Vision Acuity: Not satisfied     Eye Comfort: itchy  Do you use eye drops? : No      Aleksandra Jett - Optometric Assistant           Medical, surgical and family histories reviewed and updated 9/15/2022.       OBJECTIVE: See Ophthalmology exam    ASSESSMENT:    ICD-10-CM    1. Hyperopia of both eyes  H52.03 EYE EXAM (SIMPLE-NONBILLABLE)     REFRACTION   2. Presbyopia  H52.4    3. Nystagmus, congenital  H55.01 EYE EXAM (SIMPLE-NONBILLABLE)   4. Alternating exotropia  H50.15 EYE EXAM (SIMPLE-NONBILLABLE)       PLAN:   Update prescription     Nataliia Fry OD

## 2022-09-27 ENCOUNTER — APPOINTMENT (OUTPATIENT)
Dept: OPTOMETRY | Facility: CLINIC | Age: 42
End: 2022-09-27
Payer: MEDICARE

## 2022-09-27 PROCEDURE — 92341 FIT SPECTACLES BIFOCAL: CPT | Performed by: OPTOMETRIST

## 2022-10-23 ENCOUNTER — HEALTH MAINTENANCE LETTER (OUTPATIENT)
Age: 42
End: 2022-10-23

## 2022-10-28 ENCOUNTER — TELEPHONE (OUTPATIENT)
Dept: NEUROLOGY | Facility: CLINIC | Age: 42
End: 2022-10-28

## 2022-10-28 NOTE — TELEPHONE ENCOUNTER
Kate found orders from June that say to discontinue Rytary 145 mg capsules however she is still taking it. Reviewed what she should be taking and Kate confirmed she is taking her medications correctly. Fiona has not been sleeping well at night. She wakes up after 2 hours and lies awake. She denies extra movements, nightmares, and RBD behaviors. When asked why she cant sleep she says I don't know. They will be talking with her PCP on Monday regarding sleep and her medications. Discussed they could do a medication review with our pharmacist to help given the medication confusion has been ongoing. She declined at this time and will try to sort it out with the PCP.    Current Parkinson's Medications:  Rytary 95 2,2 caps @ 6a,9:30a   Rytary 145 2,2 caps @ 1p,4:30p  Comtan 200mg 1 tab @ 6a

## 2022-10-28 NOTE — TELEPHONE ENCOUNTER
[facility] Call Center    Phone Message    May a detailed message be left on voicemail: yes     Reason for Call: Form or Letter   Type or form/letter needing completion: Patient's group home is hoping to get an updated list of which meds the patient is currently taking vs. which meds have been discontinued   Provider: Dr. Han  Date form needed: no particular date needed  Once completed: Fax form to: [phone]      Action Taken: Message routed to:  [facility] Neurology    Travel Screening: Not Applicable

## 2022-12-15 ENCOUNTER — TELEPHONE (OUTPATIENT)
Dept: NEUROLOGY | Facility: CLINIC | Age: 42
End: 2022-12-15

## 2022-12-15 DIAGNOSIS — G20.A1 PARKINSON'S DISEASE (H): ICD-10-CM

## 2022-12-15 NOTE — TELEPHONE ENCOUNTER
"Kate,  reports last night Fiona was \"all over the place, kind of like those people overdosing on drugs in the movies\" from 6-7:30PM. Everyday at or after 2 PM she gets shaky, wiggly and withers around on the floor uncontrollably. She does not lose consciousness and is able to talk/interact with staff during these episodes. Today her temp went from 102 to 97 within 20 minutes. This is the second time she has had temperature fluctuations during the episode. They checked it because she was profusely sweating. When asked how she feels she will always say ok. This has been going on since Kate started in September and has been worsening since then.    She is taking Rytary 95 mg 2 caps at 6AM, 9:30AM  Rytary 145 mg 2 caps at 1PM, 4:30PM  Entacapone 200 mg 1 tablet at 6 AM    Movements include tongue movements/biting her tongue, arms wiggling everywhere and legs going up and folding \"into a pretzel\". They are able to help guide her to unfold her legs however they continuously move. She is not sure if she should call 911 or not. Denies loss of consciousness/unresponsiveness, injuries, convulsions, dystonia like stiffness, rythmic shaking movements, illness of her or anyone else in the home.    Plan/recommendation:  1. I will connect with Dr. Han about her symptoms  2. Go to the emergency room if symptoms worsen, injury occurs, she loses consciousness or shows signs of seizure  "

## 2022-12-16 RX ORDER — LEVODOPA AND CARBIDOPA 145; 36.25 MG/1; MG/1
CAPSULE, EXTENDED RELEASE ORAL
Qty: 90 CAPSULE | Refills: 2 | Status: SHIPPED | OUTPATIENT
Start: 2022-12-16 | End: 2023-03-21

## 2022-12-16 NOTE — TELEPHONE ENCOUNTER
faxed over BOTH rytary prescriptions and letter from Anna MICHAEL RN placed today to je Love supervisor at [phone] per LUANA MICHAEL

## 2022-12-16 NOTE — TELEPHONE ENCOUNTER
Informed Kate, house supervisor of Dr. Bruce recommendation and education on on/off fluctuations. Advised that she likely does feel better over medicated than under medicated and to keep this in mind. Kate repeated the the new medications instructions back to me correctly. She asked that I fax the new RXs and the instructions written out to her at Fax: [phone].    They called 911 last night due to Fiona reporting chest pain and her oxygen dropping to 91%. They did a CT and did not find a pulmonary embolism, noted to have subsegmental atelectasis inboth lower lobes. Calcified granuloma right upper lobe. They did not make any medications changes other than giving her 2mg of lorazepam.    Plan/recommendation:  1. Will have Dr. Han place new order for Rytary and will have this faxed over along with instructions  2. Discussed deep breathing when oxygen dips and doing this throughout the day

## 2023-01-06 ENCOUNTER — TELEPHONE (OUTPATIENT)
Dept: NEUROLOGY | Facility: CLINIC | Age: 43
End: 2023-01-06

## 2023-01-06 NOTE — TELEPHONE ENCOUNTER
Kate did call the epilepsy specialist regarding getting a depakote level ordered. She understands we will not be managing this. Reviewed with Kate that people often feel better despite have dyskinesia's that may look troublesome versus having OFF symptoms from Parkinson's disease. It is reassuring that Fiona states she feels okay when these happen. So far she has some dyskinesia but is not writhering around on the floor. She gave her 2 capsules of Rytary 95 mg at 1 PM. Fiona was able to go to the store with staff due to this. Discussed observing for and asking Fiona about her Parkinson's disease symptoms (tremor, slowness, stiffness and associated symptoms) as she may have more with the dose decrease.    [facility] called and said they were worried about serotonin syndrome because Fiona is on Gabapentin, trazodone, lorazepam, and buspirone. Kate will call the psychiatrists office and notify them. Carries will ask the pharmacy to call the doctors office with calls like this in the future. Reviewed signs and symptoms of serotonin syndrome with her and discussed some of these can overlap with Parkinson's disease symptoms.

## 2023-01-06 NOTE — TELEPHONE ENCOUNTER
Received a call from Kate to update us on how the decreased Rytary dose is going. They have not seen any difference in her movements. She is still very restless and moving around a lot/wiggling starting at 2 PM and lasting for the rest of the day. The shaking starts about 20-30 minutes after the 1 PM dose and is really bad by 2 PM. Zoila is very concerned about her although Kate says she does feel fine these movements look distressing. She confirmed Zoila does not shake or wiggle at all in the morning.    Current medications:  Medicaitons 6AM 930 AM 1 PM 4:30 PM   Rytary 95 mg 2 2 1    Rytary 145 mg   1 2   Entacapone 200 mg 1          Plan/recommendation:  1. Dr. Han discussed to make the below changes after 1 week if her movements did not improve. Go ahead and make these changes since they have not yet been made  Medications 6AM 930 AM 1 PM 4:30 PM   Rytary 95 mg 2 2 2    Rytary 145 mg    2   Entacapone 200 mg 1        2. Kate will schedule a follow up with Dr. Han. Message sent to the scheduling team

## 2023-03-20 DIAGNOSIS — G20.A1 PARKINSON'S DISEASE (H): ICD-10-CM

## 2023-03-20 NOTE — TELEPHONE ENCOUNTER
Rx Authorization:  Requested Medication/ Dose: Rytary 23.75-95 MG Caps Extended- Release   Date last refill ordered: 12/16/22  Quantity ordered: 180 tabs  # refills: 11  Date of last clinic visit with ordering provider: 7/1/22  Date of next clinic visit with ordering provider: F/U 1 year  All pertinent protocol data (lab date/result):   Include pertinent information from patients message:

## 2023-03-21 DIAGNOSIS — G20.A1 PARKINSON'S DISEASE (H): ICD-10-CM

## 2023-03-21 RX ORDER — LEVODOPA AND CARBIDOPA 95; 23.75 MG/1; MG/1
CAPSULE, EXTENDED RELEASE ORAL
Qty: 180 CAPSULE | Refills: 11 | Status: SHIPPED | OUTPATIENT
Start: 2023-03-21 | End: 2023-03-22

## 2023-03-21 RX ORDER — LEVODOPA AND CARBIDOPA 145; 36.25 MG/1; MG/1
CAPSULE, EXTENDED RELEASE ORAL
Qty: 60 CAPSULE | Refills: 11 | Status: SHIPPED | OUTPATIENT
Start: 2023-03-21 | End: 2023-03-22

## 2023-03-21 NOTE — TELEPHONE ENCOUNTER
[facility] Call Center    Phone Message    May a detailed message be left on voicemail: no     Reason for Call: Medication Question or concern regarding medication   Prescription Clarification  Name of Medication: carbidopa-levodopa ER (RYTARY) 36. MG CPCR per CR capsule  Prescribing Provider: Tonny Han   Pharmacy: N/A   What on the order needs clarification?     Patient  Kate is requesting a call back regarding the medication listed above. Kate states that the [facility] is requesting the care staff to observe the pt while pt is taking this medication and to up the dose another tablet.     Please call Kate to advise at # [phone]          Action Taken: Message routed to:  [facility] neurology     Travel Screening: Not Applicable

## 2023-03-21 NOTE — TELEPHONE ENCOUNTER
Spoke to [facility] group home staff. Fiona is taking Rytary 95 mg as 2 capsules at 6AM, 9:30AM and 1-2 capsules at 1PM depending on her movements (dyskinesia's). She is taking Rytary 145 mg as 2 capsules at 4:30 PM.

## 2023-03-22 ENCOUNTER — TELEPHONE (OUTPATIENT)
Dept: NEUROLOGY | Facility: CLINIC | Age: 43
End: 2023-03-22
Payer: MEDICARE

## 2023-03-22 RX ORDER — LEVODOPA AND CARBIDOPA 95; 23.75 MG/1; MG/1
CAPSULE, EXTENDED RELEASE ORAL
Qty: 120 CAPSULE | Refills: 11 | Status: SHIPPED | OUTPATIENT
Start: 2023-03-22 | End: 2023-10-09

## 2023-03-22 RX ORDER — LEVODOPA AND CARBIDOPA 145; 36.25 MG/1; MG/1
CAPSULE, EXTENDED RELEASE ORAL
Qty: 90 CAPSULE | Refills: 11 | Status: SHIPPED | OUTPATIENT
Start: 2023-03-22 | End: 2023-10-09

## 2023-03-22 NOTE — TELEPHONE ENCOUNTER
Kate, group home coordinator stated at 1 PM alycia is still getting Rytary 1 cap of the 95 mg and 145 mg. Staff had given the wrong information. This has been working well for her symptoms and minimal dyskinesia's. She asks that whenever we have questions we ask for her when we call as she manages all the medication's and health appointments for the residents.    She would like updated orders faxed to her: [phone]

## 2023-03-22 NOTE — TELEPHONE ENCOUNTER
[facility] Call Center    Phone Message    May a detailed message be left on voicemail: yes     Reason for Call: Medication Question or concern regarding medication   Prescription Clarification  Name of Medication: carbidopa-levodopa ER (RYTARY) 23.75-95 MG CPCR per CR capsule/carbidopa-levodopa ER (RYTARY) 36. MG CPCR per CR capsule  Prescribing Provider: Dr. Han   Ph[facility], [facility] [address]   What on the order needs clarification? Pharmacy is calling wanting to verify the correct prescription was sent. Pharmacy also would like to clarify the total dosing the pt should be taking. Please contact back to advise.    Action Taken: Message routed to:  [facility] Neurology    Travel Screening: Not Applicable

## 2023-04-02 ENCOUNTER — HEALTH MAINTENANCE LETTER (OUTPATIENT)
Age: 43
End: 2023-04-02

## 2023-04-13 ENCOUNTER — TELEPHONE (OUTPATIENT)
Dept: NEUROLOGY | Facility: CLINIC | Age: 43
End: 2023-04-13
Payer: MEDICARE

## 2023-04-13 NOTE — CONFIDENTIAL NOTE
Prior Authorization Retail Medication Request    Medication/Dose: Rytary 36.25MG/145MG CAPS  ICD code:  G20  Previously Tried and Failed:    Rationale:      Insurance Name: MEDICARE  Insurance ID: [id number]       Pharmacy Information   Name: [facility] - [place], [place] - [address]  Phone:   [phone]

## 2023-04-18 NOTE — TELEPHONE ENCOUNTER
Central Prior Authorization Team   Phone: [phone]      PA Initiation    Medication: Rytary 36.25MG/145MG CAPS  Insurance Company: [employer] - Phone [phone] Fax [phone]  Pharmacy Filling the Rx: [facility] - [place], [place] - [address]  Filling Pharmacy Phone: [phone]  Filling Pharmacy Fax:    Start Date: 4/18/2023

## 2023-04-19 NOTE — TELEPHONE ENCOUNTER
Prior Authorization Approval    Authorization Effective Date: 1/1/2023  Authorization Expiration Date: 4/17/2024  Medication: Rytary 36.25MG/145MG CAPS  Approved Dose/Quantity:   Reference #:     Insurance Company: [employer] Olmstead - Phone [phone] Fax [phone]  Expected CoPay:       CoPay Card Available:      Foundation Assistance Needed:    Which Pharmacy is filling the prescription (Not needed for infusion/clinic administered): [facility] - [place], [place] - [address]  Pharmacy Notified: Yes  Patient Notified: No

## 2023-07-06 ENCOUNTER — APPOINTMENT (OUTPATIENT)
Dept: OPTOMETRY | Facility: CLINIC | Age: 43
End: 2023-07-06
Payer: MEDICARE

## 2023-07-06 PROCEDURE — 92341 FIT SPECTACLES BIFOCAL: CPT | Performed by: OPTOMETRIST

## 2023-10-09 DIAGNOSIS — G20.A1 PARKINSON'S DISEASE (H): ICD-10-CM

## 2023-10-09 NOTE — TELEPHONE ENCOUNTER
[facility] Call [facility]    Phone Message    May a detailed message be left on voicemail: yes     Reason for Call: Medication Question or concern regarding medication   Prescription Clarification  Name of Medication:carbidopa-levodopa ER (RYTARY  Prescribing Provider:     Pharmacy:   [facility] - [facility] [address]      What on the order needs clarification?     aidan Love  at her group home called states that patients pharmacy [facility] does not have any refills left for patients medication, please send a refill as pt is out.       Action Taken: Message routed to:  [facility] neurology    Travel Screening: Not Applicable
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
## 2023-10-10 RX ORDER — LEVODOPA AND CARBIDOPA 145; 36.25 MG/1; MG/1
CAPSULE, EXTENDED RELEASE ORAL
Qty: 90 CAPSULE | Refills: 5 | Status: SHIPPED | OUTPATIENT
Start: 2023-10-10 | End: 2024-04-02

## 2023-10-10 RX ORDER — LEVODOPA AND CARBIDOPA 95; 23.75 MG/1; MG/1
CAPSULE, EXTENDED RELEASE ORAL
Qty: 120 CAPSULE | Refills: 5 | Status: SHIPPED | OUTPATIENT
Start: 2023-10-10 | End: 2024-04-02

## 2023-10-25 DIAGNOSIS — G20.A1 PARKINSON'S DISEASE (H): ICD-10-CM

## 2023-10-25 NOTE — CONFIDENTIAL NOTE
Rx Authorization:  Requested Medication/ Dos: Comtan 200MG tabs  Date last refill ordered:   Quantity ordered:   # refills:   Date of last clinic visit with ordering provider:   Date of next clinic visit with ordering provider:   All pertinent protocol data (lab date/result):   Include pertinent information from patients message:

## 2023-10-26 RX ORDER — ENTACAPONE 200 MG/1
TABLET ORAL
Qty: 31 TABLET | Refills: 3 | Status: SHIPPED | OUTPATIENT
Start: 2023-10-26 | End: 2024-01-25

## 2023-10-26 NOTE — TELEPHONE ENCOUNTER
Medication and strength: Entacapone 200 mg    Is this a [place] patient? Yes    Last appointment: 7/1/2022 with Dr. Han    Next appointment: 1/5/2024    Last re-ordered: 9/7/2022 for a 31 day supply with 11 refills    Action taken: Sent to be signed

## 2023-11-08 ENCOUNTER — TELEPHONE (OUTPATIENT)
Dept: NEUROLOGY | Facility: CLINIC | Age: 43
End: 2023-11-08
Payer: MEDICARE

## 2023-11-08 DIAGNOSIS — G20.A1 PARKINSON'S DISEASE (H): Primary | ICD-10-CM

## 2023-11-08 NOTE — TELEPHONE ENCOUNTER
[facility] Call Center    Phone Message    May a detailed message be left on voicemail: yes     Reason for Call: Other: Dianne the patients care coordinator is requesting a call back in regards to the Pt's labs that she had done at the [place]. Dianne would like to discuss them with a care team member. Please call and advise Dianne at [phone].    Action Taken: Message routed to:  [facility] Neurology     Travel Screening: Not Applicable

## 2023-11-09 ENCOUNTER — TELEPHONE (OUTPATIENT)
Dept: NEUROLOGY | Facility: CLINIC | Age: 43
End: 2023-11-09
Payer: MEDICARE

## 2023-11-09 RX ORDER — ESCITALOPRAM OXALATE 10 MG/1
20 TABLET ORAL DAILY
COMMUNITY

## 2023-11-09 NOTE — CONFIDENTIAL NOTE
Received lab results from [facility]   Records Date of service: 11/8/23  Copy has been sent to scanning and emailed to provider
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
## 2023-11-25 ENCOUNTER — MEDICAL CORRESPONDENCE (OUTPATIENT)
Dept: HEALTH INFORMATION MANAGEMENT | Facility: CLINIC | Age: 43
End: 2023-11-25
Payer: MEDICARE

## 2023-12-11 ENCOUNTER — DOCUMENTATION ONLY (OUTPATIENT)
Dept: NEUROLOGY | Facility: CLINIC | Age: 43
End: 2023-12-11
Payer: MEDICARE

## 2023-12-11 NOTE — PROGRESS NOTES
Received medication list from [facility]  Records Date of service: 12/8/23  Copy has been sent to scanning and emailed to provider and nurse

## 2023-12-14 DIAGNOSIS — G20.A1 PARKINSON'S DISEASE, UNSPECIFIED WHETHER DYSKINESIA PRESENT, UNSPECIFIED WHETHER MANIFESTATIONS FLUCTUATE (H): Primary | ICD-10-CM

## 2024-01-24 DIAGNOSIS — G20.A1 PARKINSON'S DISEASE (H): ICD-10-CM

## 2024-01-25 RX ORDER — ENTACAPONE 200 MG/1
TABLET ORAL
Qty: 30 TABLET | Refills: 0 | Status: SHIPPED | OUTPATIENT
Start: 2024-01-25 | End: 2024-03-07

## 2024-01-25 NOTE — TELEPHONE ENCOUNTER
Patient saw Dr. Morris on 11/2/2023 for a consult. It appears they were encouraged to follow-up with Dr. Han. She has not seen Dr. Han since 7/1/2022. Will send a 30 day supply to be signed with a note stating she must make an appointment for future refills.

## 2024-03-07 DIAGNOSIS — G20.A1 PARKINSON'S DISEASE (H): ICD-10-CM

## 2024-03-07 RX ORDER — ENTACAPONE 200 MG/1
TABLET ORAL
Qty: 30 TABLET | Refills: 1 | Status: SHIPPED | OUTPATIENT
Start: 2024-03-07 | End: 2024-04-29

## 2024-03-07 NOTE — TELEPHONE ENCOUNTER
T'd up 60 day coverage to last until appt                                             RX Authorization    Medication: entacapone (COMTAN) 200 MG tablet    Date last refill ordered: 1/25/24    Quantity ordered: 30    # refills: 0    Date of last clinic visit with ordering provider: 7/1/22    Date of next clinic visit with ordering provider: 4/26/24

## 2024-04-01 DIAGNOSIS — G20.A1 PARKINSON'S DISEASE (H): ICD-10-CM

## 2024-04-02 RX ORDER — LEVODOPA AND CARBIDOPA 145; 36.25 MG/1; MG/1
CAPSULE, EXTENDED RELEASE ORAL
Qty: 90 CAPSULE | Refills: 0 | Status: SHIPPED | OUTPATIENT
Start: 2024-04-02 | End: 2024-04-26

## 2024-04-02 RX ORDER — LEVODOPA AND CARBIDOPA 95; 23.75 MG/1; MG/1
CAPSULE, EXTENDED RELEASE ORAL
Qty: 120 CAPSULE | Refills: 0 | Status: SHIPPED | OUTPATIENT
Start: 2024-04-02 | End: 2024-04-26

## 2024-04-02 NOTE — TELEPHONE ENCOUNTER
Sending 30 day supply to last until return appt.                                           RX Authorization    Medication: carbidopa-levodopa ER (RYTARY) 23.75-95 MG CPCR per CR capsule     Date last refill ordered: 10/10/23    Quantity ordered: 120    # refills: 5    Date of last clinic visit with ordering provider: 7/1/22    Date of next clinic visit with ordering provider: 4/26/24                                            RX Authorization    Medication: carbidopa-levodopa ER (RYTARY) 36. MG CPCR per CR capsule     Date last refill ordered: 10/10/23    Quantity ordered: 90    # refills: 5

## 2024-04-19 ASSESSMENT — MOVEMENT DISORDERS SOCIETY - UNIFIED PARKINSONS DISEASE RATING SCALE (MDS-UPDRS)
TOTAL_SCORE: 0
HOBBIES_AND_OTHER_ACTIVITIES: (1) SLIGHT: I AM A BIT SLOW BUT DO THESE ACTIVITIES EASILY.
HYGIENE: (2) MILD: I NEED SOMEONE ELSE TO HELP ME WITH SOME HYGIENE TASKS.
TREMOR: (3) MODERATE: SHAKING OR TREMOR CAUSES PROBLEMS WITH MANY OF MY DAILY ACTIVITIES.
SALIVA_AND_DROOLING: (0) NORMAL: NOT AT ALL (NO PROBLEMS).
HANDWRITING: (4) SEVERE: MOST OR ALL WORDS CANNOT BE READ.
CHEWING_AND_SWALLOWING: (0) NORMAL: NO PROBLEMS.
GETTING_OUT_OF_BED_CAR_DEEP_CHAIR: (2) MILD: I NEED MORE THAN ONE TRY TO GET UP OR NEED OCCASIONAL HELP.
FREEZING: (3) MODERATE: WHEN I FREEZE I HAVE A LOT OF TROUBLE STARTING TO WALK AGAIN AND, BECAUSE OF FREEZING, I SOMETIMES NEED TO USE A WALKING AID OR NEED SOMEONE ELSE'S HELP.
TURNING_IN_BED: (1) SLIGHT: I HAVE A BIT OF TROUBLE TURNING, BUT I DO NOT NEED ANY HELP.
SPEECH: (0) NORMAL: NOT AT ALL (NO PROBLEMS).
DRESSING: (2) MILD: I AM SLOW AND NEED HELP FOR A FEW DRESSING TASKS (BUTTONS, BRACELETS).
EATING_TASKS: (0) NORMAL: NOT AT ALL (NO PROBLEMS).
WALKING_AND_BALANCE: (1) SLIGHT: I AM SLIGHTLY SLOW OR MAY DRAG A LEG.  I NEVER USE A WALKING AID.

## 2024-04-25 NOTE — PROGRESS NOTES
Department of Neurology  Movement Disorders Division   Follow-up Note    Patient: Fiona Murphy   MRN: [id number]   : 1980   Date of Visit: 2024    Fiona Murphy is a 43 year old female with a history of intellectual disability, major depressive disorder, generalized anxiety disorder, intermittent explosive disorder who returns to clinic for follow up of Parkinson's disease (since at least ). She was last seen by us 2022 at which time morning Rytary was reduced and morning entacapone was added in an attempt to reduce dyskinesias without reducing ON time.    INTERVAL EVENTS:  She has been seen at [facility], who posited a possible genetic etiology for her parkinsonism, hearing loss, and intellectual disability. They sent serum copper testing and they will review genetic testing to potentially broaden inherited Parkinson's panel and include mitochondrial disorders. No treatment changes were made at their last visit 23.    She had a fall 1/10/24 and presented to the ED. CT head and C spine were unremarkable and she was discharged back to her group home.     Today, she is accompanied by Kate.    Kate notes that Everetts behavior has been increasingly disruptive. They have a behavior analyst working with her. Kate notes that she is newly smearing feces on the floor and volitionally increasing her shaking to make herself sweat and can get agitated during this. Fiona has been refusing to go to doctor's visits in the past several months, has been very difficult to get her anywhere. Kate feels Fiona has generally changed quite a bit behaviorally. There is more confabulation and combativeness overall. She has jaw clenching that can cause tooth pain.    Kate also notes worsening shaking, incontinence of urine and stool, and behavior very consistently after the 1pm dose of Rytary. She will often take a nap around 2pm and can sleep until 5pm, after which she wakes up feeling relatively well  "and her behavior is more calm. Kate also notes full-body dyskinesias that tend to occur less predictably and Kate feels Fiona subjectively feels better when she is \"sepideh.\"          6a 9:30a 1p 4:30p   Rytary 145 0 0 2 2   Rytary 95 2  2          Depakote 500mg AM  1250mg PM  Quetiapine 50 mg at bedtime  Buspar 30 mg bid  Lithium: 300 mg 8pm    ROS:  All others negative except as listed above.      Current Outpatient Medications   Medication Sig Dispense Refill    Acetaminophen 325 MG CAPS Take 325-650 mg by mouth every 4 hours as needed      carbidopa-levodopa ER (RYTARY) 23.75-95 MG CPCR per CR capsule TAKE 2 CAPSULES BY MOUTH TWICE DAILY AT 6AM & 9:30AM. 120 capsule 0    carbidopa-levodopa ER (RYTARY) 36. MG CPCR per CR capsule TAKE 1 CAPSULE BY MOUTH ONCE DAILY AT 1PM;TAKE 2 CAPSULES BY MOUTH ONCE DAILY AT 4:30PM. 90 capsule 0    clonazePAM (KLONOPIN) 0.5 MG tablet       divalproex sodium delayed-release (DEPAKOTE) 250 MG DR tablet Divalproex Sodium  mg 1 tab in the evening 30 tablet 0    divalproex sodium extended-release (DEPAKOTE ER) 500 MG 24 hr tablet TAKE 1 TABLET BY MOUTH EVERY MORNING,;TAKE 2 TABLETS (1000MG) BY MOUTH EVERY EVENING. 90 tablet 0    entacapone (COMTAN) 200 MG tablet TAKE 1 TABLET BY MOUTH EVERY MORNING WITH 6AM RYTARY *2 TOTAL FILL, PATIENT NEEDS APPOINTMENT FOR FURTHER REFILLS* 30 tablet 1    gabapentin (NEURONTIN) 300 MG capsule 300 mg 300mg in the morning  2 caps (600mg) at bed time      medroxyPROGESTERone (DEPO-PROVERA) 150 MG/ML IM injection Inject 150 mg into the muscle      melatonin (MELATONIN MAXIMUM STRENGTH) 5 MG tablet Take 1 tablet by mouth at bedtime      Multiple Vitamin (DAILY-SAVANNA) TABS       omeprazole (PRILOSEC) 40 MG DR capsule       prazosin (MINIPRESS) 2 MG capsule Take 1 capsule by mouth at bedtime      senna-docusate (SENOKOT-S/PERICOLACE) 8.6-50 MG tablet Take 1 tablet by mouth      traZODone (DESYREL) 100 MG tablet Take 1 tablet by mouth at " "bedtime      busPIRone (BUSPAR) 15 MG tablet Take 1/2 tab twice daily for 7 days then increase to 1 tab twice daily therafter. (Patient not taking: Reported on 4/26/2024)      busPIRone HCl (BUSPAR) 30 MG tablet 30 mg 2 times daily (Patient not taking: Reported on 4/26/2024)      escitalopram (LEXAPRO) 10 MG tablet Take 10 mg by mouth daily (Patient not taking: Reported on 4/26/2024)      fish oil-omega-3 fatty acids 1000 MG capsule Take 2 g by mouth daily (Patient not taking: Reported on 4/26/2024)      folic acid (FOLVITE) 1 MG tablet  (Patient not taking: Reported on 4/26/2024)      lithium ER (LITHOBID) 300 MG CR tablet 300 mg      magnesium oxide (MAG-OX) 400 MG tablet  (Patient not taking: Reported on 4/26/2024)      QUEtiapine (SEROQUEL) 50 MG tablet Take 50 mg by mouth (Patient not taking: Reported on 4/26/2024)      vitamin D2 (ERGOCALCIFEROL) 56967 units (1250 mcg) capsule  (Patient not taking: Reported on 4/26/2024)         No Known Allergies       PHYSICAL EXAM:  Pulse 89   Resp 16   Ht 1.6 m (5' 3\")   Wt 51.5 kg (113 lb 9.6 oz)   SpO2 95%   BMI 20.12 kg/m        4/26/2024     3:00 PM   UPDRS Motor Scale   Time: 15:50   Medication On   R Brain DBS: None   L Brain DBS: None   Dyskinesia (LID) No   Speech 4   Facial Expression 3   Rigidity Neck 0   Rigidity RUE 2   Rigidity LUE 3   Rigidity RLE 4   Rigidity LLE 4   Finger Taps R 2   Finger Taps L 2   Hand Mvt R 3   Hand Mvt L 3   Pron-/Supinate R 3   Pron-/Supinate L 3   Toe Tap R 4   Toe Tap L 2   Leg Agility R 3   Leg Agility L 2   Arise From Chair 2   Gait 4   Gait Freezing 0   Postural Stability 4   Posture 2   Global Spont Mvt 2   Postural Tremor RUE 3   Postural Tremor LUE 3   Kinetic Tremor RUE 3   Kinetic Tremor LUE 3   Rest Tremor RUE 3   Rest Tremor LUE 2   Rest Tremor RLE 3   Rest Tremor LLE 2   Rest Tremor Lip/Jaw 2   Rest Tremor Constancy 4   Total Right 33   Total Left 29   Axial Total 21   Total 89         LABS:  11/2/23  Manganese, B " 5.4 --   Copper, S -- 134     IMAGING:  CT head 24  IMPRESSION: No acute intracranial abnormality.     CT C spine 24  IMPRESSION: No acute fracture identified.     ASSESSMENT/PLAN:  Fiona Murphy is a 43 year old female with a history of intellectual disability, major depressive disorder, generalized anxiety disorder, intermittent explosive disorder who returns to clinic for follow up of Parkinson's disease (since at least ).    Major management challenge has been balancing dyskinesias with OFF time.     Today, she seems to be having wearing off before 1pm and perhaps kicking in while she naps. We can interpret dyskinesias as ON time given the subjective improvement in symptoms overall and better behavior. We will opt to increase 9:30 dose in an attempt to mitigate the wearing off with the caveat that it may worsen dyskinesias.    Week 1 and 2:    6a 9:30a 1p 4:30p   Rytary 145 0 1 2 2   Rytary 95 2 1         Week 3 and beyond    6a 9:30a 1p 4:30p   Rytary 145 0 2 2 2   Rytary 95 2 0         Return to clinic in 3mo    Morgan Thomas MD  Fellow  Movement Disorders Division  [facility] Neurology    Patient seen and discussed with attending neurologist Dr. Tonny Han    Patient seen and examined with Dr. Tolentino and I agree with the assessment and plan as outlined.  Time this date with patient, reviewing records, & documentin.5h.  The longitudinal plan of care for Parkinson's was addressed during this visit. Due to the added complexity in care, we will continue to provide support in the subsequent management of this condition and with the ongoing continuity of care.    Tonny Han PhD, MD

## 2024-04-26 ENCOUNTER — OFFICE VISIT (OUTPATIENT)
Dept: NEUROLOGY | Facility: CLINIC | Age: 44
End: 2024-04-26
Payer: MEDICARE

## 2024-04-26 VITALS
WEIGHT: 113.6 LBS | HEART RATE: 89 BPM | OXYGEN SATURATION: 95 % | RESPIRATION RATE: 16 BRPM | BODY MASS INDEX: 20.13 KG/M2 | HEIGHT: 63 IN

## 2024-04-26 DIAGNOSIS — G20.A1 PARKINSON'S DISEASE, UNSPECIFIED WHETHER DYSKINESIA PRESENT, UNSPECIFIED WHETHER MANIFESTATIONS FLUCTUATE (H): Primary | ICD-10-CM

## 2024-04-26 PROCEDURE — 99214 OFFICE O/P EST MOD 30 MIN: CPT | Mod: GC | Performed by: INTERNAL MEDICINE

## 2024-04-26 PROCEDURE — G2211 COMPLEX E/M VISIT ADD ON: HCPCS | Performed by: INTERNAL MEDICINE

## 2024-04-26 RX ORDER — LEVODOPA AND CARBIDOPA 145; 36.25 MG/1; MG/1
CAPSULE, EXTENDED RELEASE ORAL
Qty: 90 CAPSULE | Refills: 5 | Status: SHIPPED | OUTPATIENT
Start: 2024-04-26 | End: 2024-04-29

## 2024-04-26 RX ORDER — PRAZOSIN HYDROCHLORIDE 2 MG/1
1 CAPSULE ORAL AT BEDTIME
COMMUNITY
Start: 2023-06-13

## 2024-04-26 RX ORDER — CLONAZEPAM 0.5 MG/1
0.5 TABLET ORAL
COMMUNITY
Start: 2024-04-11

## 2024-04-26 RX ORDER — TRAZODONE HYDROCHLORIDE 100 MG/1
1 TABLET ORAL AT BEDTIME
COMMUNITY
Start: 2023-06-13

## 2024-04-26 RX ORDER — LEVODOPA AND CARBIDOPA 95; 23.75 MG/1; MG/1
CAPSULE, EXTENDED RELEASE ORAL
Qty: 120 CAPSULE | Refills: 5 | Status: SHIPPED | OUTPATIENT
Start: 2024-04-26 | End: 2024-04-29

## 2024-04-26 ASSESSMENT — UNIFIED PARKINSONS DISEASE RATING SCALE (UPDRS)
LEG_AGILITY_LEFT: (2) MILD: ANY OF THE FOLLOWING: A) 3 TO 5 INTERRUPTIONS DURING TAPPING  B) MILD SLOWING  C) THE AMPLITUDE DECREMENTS MIDWAY IN THE 10-MOVEMENT SEQUENCE.
FINGER_TAPPING_LEFT: (2) MILD: ANY OF THE FOLLOWING: A) 3 TO 5 INTERRUPTIONS DURING TAPPING  B) MILD SLOWING  C) THE AMPLITUDE DECREMENTS MIDWAY IN THE 10-MOVEMENT SEQUENCE.
FREEZING_GAIT: (0) NORMAL: NO FREEZING.
PRONATION_SUPINATION_LEFT: (3) MODERATE: ANY OF THE FOLLOWING: A) MORE THAN 5 INTERRUPTIONS OR AT LEAST ONE LONGER ARREST (FREEZE) IN ONGOING MOVEMENT  B) MODERATE SLOWING  C) THE AMPLITUDE DECREMENTS STARTING AFTER THE FIRST MOVEMENT.
GAIT: (4) SEVERE: CANNOT WALK AT ALL OR ONLY WITH ANOTHER PERSON'S ASSISTANCE.
SPONTANEITY_OF_MOVEMENT: (2) MILD: MILD GLOBAL SLOWNESS AND POVERTY OF SPONTANEOUS MOVEMENTS.
RIGIDITY_RLE: (4) SEVERE: RIGIDITY DETECTED WITHOUT THE ACTIVATION MANEUVER AND FULL RANGE OF MOTION NOT ACHIEVED.
TOTAL_SCORE: 33
FACIAL_EXPRESSION: (3) MASKED FACIES WITH LIPS PARTED SOME OF THE TIME WHEN THE MOUTH IS AT REST.
POSTURE: (2) MILD: DEFINITE FLEXION, SCOLIOSIS OR LEANING TO ONE SIDE, BUT CAN CORRECT POSTURE TO NORMAL WHEN ASKED.
AMPLITUDE_RLE: (3) MODERATE: 3 - 10 CM IN MAXIMAL AMPLITUDE.
CONSTANCY_TREMOR_ATREST: (4) SEVERE: TREMOR AT REST IS PRESENT > 75% OF THE ENTIRE EXAMINATION PERIOD.
TOETAPPING_LEFT: (2) MILD: ANY OF THE FOLLOWING: A) 3 TO 5 INTERRUPTIONS DURING TAPPING  B) MILD SLOWING  C) THE AMPLITUDE DECREMENTS MIDWAY IN THE 10-MOVEMENT SEQUENCE.
FINGER_TAPPING_RIGHT: (2) MILD: ANY OF THE FOLLOWING: A) 3 TO 5 INTERRUPTIONS DURING TAPPING  B) MILD SLOWING  C) THE AMPLITUDE DECREMENTS MIDWAY IN THE 10-MOVEMENT SEQUENCE.
SPEECH: (4) SEVERE: MOST SPEECH IS DIFFICULT TO UNDERSTAND OR UNINTELLIGIBLE.
AMPLITUDE_LUE: (2) MILD: > 1 CM BUT < 3 CM IN MAXIMAL AMPLITUDE.
PARKINSONS_MEDS: ON
RIGIDITY_LUE: (3) MODERATE: RIGIDITY DETECTED WITHOUT THE ACTIVATION MANEUVER. FULL RANGE OF MOTION IS ACHIEVED WITH EFFORT.
POSTURAL_STABILITY: (4) SEVERE: VERY UNSTABLE, TENDS TO LOSE BALANCE SPONTANEOUSLY OR WITH JUST A GENTLE PULL ON THE SHOULDERS.
RIGIDITY_LLE: (4) SEVERE: RIGIDITY DETECTED WITHOUT THE ACTIVATION MANEUVER AND FULL RANGE OF MOTION NOT ACHIEVED.
RIGIDITY_NECK: (0) NORMAL: NO RIGIDITY.
RIGIDITY_RUE: (2) MILD: RIGIDITY DETECTED WITHOUT THE ACTIVATION MANEUVER. FULL RANGE OF MOTION IS EASILY ACHIEVED.
AMPLITUDE_LIP_JAW: (2) MILD: > 1 BUT < 2 CM IN MAXIMAL AMPLITUDE.
HANDMOVEMENTS_LEFT: (3) MODERATE: ANY OF THE FOLLOWING: A) MORE THAN 5 INTERRUPTIONS OR AT LEAST ONE LONGER ARREST (FREEZE) IN ONGOING MOVEMENT  B) MODERATE SLOWING  C) THE AMPLITUDE DECREMENTS STARTING AFTER THE FIRST MOVEMENT.
DYSKINESIAS_PRESENT: NO
TOTAL_SCORE: 89
AMPLITUDE_LLE: (2) MILD: > 1 CM BUT < 3 CM IN MAXIMAL AMPLITUDE.
TOETAPPING_RIGHT: (4) SEVERE: CANNOT OR CAN ONLY BARELY PERFORM THE TASK BECAUSE OF SLOWING, INTERRUPTIONS, OR DECREMENTS.
AXIAL_SCORE: 21
AMPLITUDE_RUE: (3) MODERATE: 3 - 10 CM IN MAXIMAL AMPLITUDE.
PRONATION_SUPINATION_RIGHT: (3) MODERATE: ANY OF THE FOLLOWING: A) MORE THAN 5 INTERRUPTIONS OR AT LEAST ONE LONGER ARREST (FREEZE) IN ONGOING MOVEMENT  B) MODERATE SLOWING  C) THE AMPLITUDE DECREMENTS STARTING AFTER THE FIRST MOVEMENT.
HANDMOVEMENTS_RIGHT: (3) MODERATE: ANY OF THE FOLLOWING: A) MORE THAN 5 INTERRUPTIONS OR AT LEAST ONE LONGER ARREST (FREEZE) IN ONGOING MOVEMENT  B) MODERATE SLOWING  C) THE AMPLITUDE DECREMENTS STARTING AFTER THE FIRST MOVEMENT.
LEG_AGILITY_RIGHT: (3) MODERATE: ANY OF THE FOLLOWING: A) MORE THAN 5 INTERRUPTIONS  OR AT LEAST ONE LONGER ARREST (FREEZE) IN ONGOING MOVEMENT  B) MODERATE SLOWING  C) THE AMPLITUDE DECREMENTS STARTING AFTER THE FIRST MOVEMENT.
TOTAL_SCORE_LEFT: 29
ARISING_CHAIR: (2) MILD: PUSHES SELF UP FROM ARMS OF CHAIR WITHOUT DIFFICULTY.

## 2024-04-26 NOTE — PATIENT INSTRUCTIONS
Let's try to increase medications in the late morning to prevent the early afternoon bad shaking:    Week 1 and 2:    6a 9:30a 1p 4:30p   Rytary 145 0 1 2 2   Rytary 95 2 1         Week 3 and beyond    6a 9:30a 1p 4:30p   Rytary 145 0 2 2 2   Rytary 95 2 0

## 2024-04-29 ENCOUNTER — TELEPHONE (OUTPATIENT)
Dept: NEUROLOGY | Facility: CLINIC | Age: 44
End: 2024-04-29

## 2024-04-29 DIAGNOSIS — G20.A1 PARKINSON'S DISEASE (H): ICD-10-CM

## 2024-04-29 RX ORDER — LEVODOPA AND CARBIDOPA 145; 36.25 MG/1; MG/1
CAPSULE, EXTENDED RELEASE ORAL
Qty: 180 CAPSULE | Refills: 0 | Status: SHIPPED | OUTPATIENT
Start: 2024-04-29 | End: 2024-06-03

## 2024-04-29 RX ORDER — LEVODOPA AND CARBIDOPA 95; 23.75 MG/1; MG/1
CAPSULE, EXTENDED RELEASE ORAL
Qty: 90 CAPSULE | Refills: 0 | Status: SHIPPED | OUTPATIENT
Start: 2024-04-29 | End: 2024-06-03

## 2024-04-29 RX ORDER — ENTACAPONE 200 MG/1
TABLET ORAL
Qty: 31 TABLET | Refills: 11 | Status: SHIPPED | OUTPATIENT
Start: 2024-04-29

## 2024-04-29 NOTE — TELEPHONE ENCOUNTER
Prior Authorization Specialty Medication Request    Medication/Dose: carbidopa-levodopa ER (RYTARY) 36. MG CPCR per CR capsule  Diagnosis and ICD code (if different than what is on RX):    New/renewal/insurance change PA/secondary ins. PA:  Previously Tried and Failed:      Important Lab Values:   Rationale:     Insurance   Primary:   Insurance ID:      Secondary (if applicable):  Insurance ID:      Pharmacy Information (if different than what is on RX)  Name:    Phone:    Fax:

## 2024-04-29 NOTE — TELEPHONE ENCOUNTER
1yr supply requested                                           RX Authorization    Medication: entacapone (COMTAN) 200 MG tablet     Date last refill ordered: 3/7/24    Quantity ordered: 30    # refills: 1    Date of last clinic visit with ordering provider: 4/26/24    Date of next clinic visit with ordering provider: 7/26/24

## 2024-04-29 NOTE — TELEPHONE ENCOUNTER
Prior Authorization Specialty Medication Request    Medication/Dose: carbidopa-levodopa ER (RYTARY) 23.75-95 MG CPCR per CR capsule  Diagnosis and ICD code (if different than what is on RX):    New/renewal/insurance change PA/secondary ins. PA:  Previously Tried and Failed:      Important Lab Values:   Rationale:     Insurance   Primary:   Insurance ID:      Secondary (if applicable):  Insurance ID:      Pharmacy Information (if different than what is on RX)  Name:    Phone:    Fax:

## 2024-04-29 NOTE — TELEPHONE ENCOUNTER
Scripts require clarification, new Rx needed. 30 day supply T'd up with updated signatures. Please make changes as appropriate.                                            RX Authorization    Medication: carbidopa-levodopa ER (RYTARY) 36. MG CPCR per CR capsule     Date last refill ordered: 4/26/24    Quantity ordered: 90    # refills: 5    Updated quantity: 180 capsules (30 day supply @6/day)    Suggested signature:   Take 2 capsules by mouth at 1pm and 4:30pm. In addition, take 1 capsule by mouth at 9:30am for two weeks, then 2 capsules by mouth at 9:30am. Total after tapering up = 6/day                                            RX Authorization    Medication: carbidopa-levodopa ER (RYTARY) 23.75-95 MG CPCR per CR capsule     Date last refill ordered: 4/26/24    Quantity ordered: 120    # refills: 5    Updated quantity: 90 capsules (30 day supply @3/day)    Suggested signature: Take 2 capsules by mouth at 6am. In addition, take 1 capsule by mouth at 9:30am for two weeks, then 0 tablets at 9:30am. Total after tapering down = 2/day

## 2024-04-29 NOTE — TELEPHONE ENCOUNTER
[facility] Call Center    Phone Message    May a detailed message be left on voicemail: yes     Reason for Call: Other: Kate  is calling to discuss medication Rytary. Please send new script in.     Action Taken: Message routed to:  [facility] Neurology    Travel Screening: Not Applicable

## 2024-05-13 NOTE — TELEPHONE ENCOUNTER
Prior Authorization Approval    Medication: RYTARY 36. MG PO CPCR  Authorization Effective Date: 1/1/2024  Authorization Expiration Date: 5/13/2025  Approved Dose/Quantity:   Reference #:     Insurance Company: [employer] - Phone [phone] Fax [phone]  Expected CoPay: $    CoPay Card Available:      Financial Assistance Needed:   Which Pharmacy is filling the prescription: [facility] - [place], [place] - [address]  Pharmacy Notified: Yes  Patient Notified: Pharmacy send to patient

## 2024-05-13 NOTE — TELEPHONE ENCOUNTER
Retail Pharmacy Prior Authorization Team   Phone: [phone]    PA Initiation    Medication: RYTARY 23.75-95 MG PO CPCR  Insurance Company: [employer] - Phone [phone] Fax [phone]  [facility] the Rx[facility] - [place], [place] - [address]  Filling Pharmacy Phone: [phone]  Filling Pharmacy Fax:    Start Date: 5/13/2024

## 2024-05-13 NOTE — TELEPHONE ENCOUNTER
Prior Authorization Approval    Medication: RYTARY 23.75-95 MG PO CPCR  Authorization Effective Date: 1/1/2024  Authorization Expiration Date: 5/13/2025  Approved Dose/Quantity:   Reference #:     Insurance Company: [employer] - Phone [phone] Fax [phone]  Expected CoPay: $    CoPay Card Available:      Financial Assistance Needed:   Which Pharmacy is filling the prescription: [facility] - [place], [place] - [address]  Pharmacy Notified: Yes  Patient Notified: Pharmacy will send to patient

## 2024-06-03 DIAGNOSIS — G20.A1 PARKINSON'S DISEASE (H): ICD-10-CM

## 2024-06-03 RX ORDER — LEVODOPA AND CARBIDOPA 95; 23.75 MG/1; MG/1
CAPSULE, EXTENDED RELEASE ORAL
Qty: 60 CAPSULE | Refills: 11 | Status: SHIPPED | OUTPATIENT
Start: 2024-06-03

## 2024-06-03 RX ORDER — LEVODOPA AND CARBIDOPA 145; 36.25 MG/1; MG/1
CAPSULE, EXTENDED RELEASE ORAL
Qty: 180 CAPSULE | Refills: 11 | Status: SHIPPED | OUTPATIENT
Start: 2024-06-03

## 2024-06-03 NOTE — TELEPHONE ENCOUNTER
[facility] Call Center    Phone Message    May a detailed message be left on voicemail: yes     Reason for Call: Medication Refill Request    Has the patient contacted the pharmacy for the refill? Yes   Name of medication being requested: carbidopa-levodopa ER (RYTARY) 23.75-95 MG CPCR per CR capsule and carbidopa-levodopa ER (RYTARY) 36. MG CPCR per CR capsule  Provider who prescribed the medication: Dr. Han  Pharmacy: [facility] [address]  Date medication is needed: 6/3/24     [facility] stated that Fiona is out of this medication.    Question or concern regarding medication   Prescription Clarification  Name of Medication: carbidopa-levodopa ER (RYTARY) 23.75-95 MG CPCR per CR capsule and carbidopa-levodopa ER (RYTARY) 36. MG CPCR per CR capsule  Prescribing Provider: Dr. Han   Pharmacy: [facility] [address]   What on the order needs clarification? [facility] stated that they are needing to directions with times for these medications faxed to [facility] and is also needing a copy faxed to [phone].    Action Taken: Message routed to:  [facility]    Travel Screening: Not Applicable     Date of Service:

## 2024-06-03 NOTE — TELEPHONE ENCOUNTER
Rytary 23.75-95 mg and Rytary 36. mg were both ordered on 4/29/24 for a 30 day supply with 0 refills.

## 2024-07-26 ENCOUNTER — VIRTUAL VISIT (OUTPATIENT)
Dept: NEUROLOGY | Facility: CLINIC | Age: 44
End: 2024-07-26
Payer: MEDICARE

## 2024-07-26 DIAGNOSIS — G20.B2 PARKINSON'S DISEASE WITH DYSKINESIA AND FLUCTUATING MANIFESTATIONS (H): Primary | ICD-10-CM

## 2024-07-26 PROCEDURE — G2211 COMPLEX E/M VISIT ADD ON: HCPCS | Mod: 95 | Performed by: PSYCHIATRY & NEUROLOGY

## 2024-07-26 PROCEDURE — 99214 OFFICE O/P EST MOD 30 MIN: CPT | Mod: 95 | Performed by: PSYCHIATRY & NEUROLOGY

## 2024-07-26 RX ORDER — CARBIDOPA AND LEVODOPA 25; 100 MG/1; MG/1
1 TABLET ORAL EVERY MORNING
Qty: 90 TABLET | Refills: 0 | Status: SHIPPED | OUTPATIENT
Start: 2024-07-26 | End: 2024-09-10

## 2024-07-26 ASSESSMENT — MOVEMENT DISORDERS SOCIETY - UNIFIED PARKINSONS DISEASE RATING SCALE (MDS-UPDRS)
TURNING_IN_BED: (1) SLIGHT: I HAVE A BIT OF TROUBLE TURNING, BUT I DO NOT NEED ANY HELP.
TOTAL_SCORE: 21
CHEWING_AND_SWALLOWING: (0) NORMAL: NO PROBLEMS.
FREEZING: (3) MODERATE: WHEN I FREEZE I HAVE A LOT OF TROUBLE STARTING TO WALK AGAIN AND, BECAUSE OF FREEZING, I SOMETIMES NEED TO USE A WALKING AID OR NEED SOMEONE ELSE'S HELP.
HANDWRITING: (2) MILD: SOME WORDS ARE UNCLEAR AND DIFFICULT TO READ.
TREMOR: (3) MODERATE: SHAKING OR TREMOR CAUSES PROBLEMS WITH MANY OF MY DAILY ACTIVITIES.
GETTING_OUT_OF_BED_CAR_DEEP_CHAIR: (2) MILD: I NEED MORE THAN ONE TRY TO GET UP OR NEED OCCASIONAL HELP.
WALKING_AND_BALANCE: (1) SLIGHT: I AM SLIGHTLY SLOW OR MAY DRAG A LEG.  I NEVER USE A WALKING AID.
EATING_TASKS: (2) MILD: I AM SLOW WITH MY EATING AND HAVE OCCASIONAL FOOD SPILLS.  I MAY NEED HELP WITH A FEW TASKS SUCH AS CUTTING MEAT.
SPEECH: (3) MODERATE: MY SPEECH IS UNCLEAR ENOUGH THAT OTHERS ASK ME TO REPEAT MYSELF EVERY DAY EVEN THOUGH MOST OF MY SPEECH IS UNDERSTOOD.
SALIVA_AND_DROOLING: (0) NORMAL: NOT AT ALL (NO PROBLEMS).
HOBBIES_AND_OTHER_ACTIVITIES: (1) SLIGHT: I AM A BIT SLOW BUT DO THESE ACTIVITIES EASILY.
DRESSING: (1) SLIGHT: I AM SLOW, BUT I DO NOT NEED HELP.
HYGIENE: (2) MILD: I NEED SOMEONE ELSE TO HELP ME WITH SOME HYGIENE TASKS.

## 2024-07-26 ASSESSMENT — PAIN SCALES - GENERAL: PAINLEVEL: NO PAIN (0)
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
## 2024-07-26 NOTE — PATIENT INSTRUCTIONS
- Start Sinemet (25/100mg)   - Week 1: Add Sinemet (25/100mg) 0.5 tablet at 6AM. If tolerated, can increase on week 2   - Week 2+: Increase to Sinemet (25/100mg) 1 tablet at 6AM   - Let Dr Han know how this is affecting your symptoms after 3-4 weeks  - Continue other medications without changes  - Continue to work on her other behavior issues with the Behavioral Analyst and Psychiatrist  - Follow-up as previously scheduled on 12/6/2024

## 2024-07-26 NOTE — NURSING NOTE
Current patient location: [address] [place] [address]    Is the patient currently in the state of [place]? YES    Visit mode:VIDEO    If the visit is dropped, the patient can be reconnected by: VIDEO VISIT: Send to e-mail at: [email or web address]    Will anyone else be joining the visit? NO  (If patient encounters technical issues they should call [phone])    How would you like to obtain your AVS? MyChart    Are changes needed to the allergy or medication list? No    Are refills needed on medications prescribed by this physician? NO    Reason for visit: Follow Up    Rosalia TOTH

## 2024-07-26 NOTE — PROGRESS NOTES
Virtual Visit Details    Type of service:  Video Visit   Video Start Time:  4:00PM  Video End Time: 4:25PM    Originating Location (pt. Location): Other halfway    Distant Location (provider location):  On-site  Platform used for Video Visit: Leif

## 2024-07-29 ENCOUNTER — DOCUMENTATION ONLY (OUTPATIENT)
Dept: OTHER | Facility: CLINIC | Age: 44
End: 2024-07-29
Payer: MEDICARE

## 2024-07-29 ENCOUNTER — TELEPHONE (OUTPATIENT)
Dept: NEUROLOGY | Facility: CLINIC | Age: 44
End: 2024-07-29
Payer: MEDICARE

## 2024-07-29 NOTE — TELEPHONE ENCOUNTER
Medication list receive [employer], [facility].    Medications needing to be updated by the provider:  -Divalproex  mg - taking 2 tablets by mouth every morning with 250 mg divalproex DR tab = 1250 mg and taking divalproex  mg as 3 capsules (1500 mg) in the evening    Medications Fiona is no longer taking:  -carbidopa-levodopa  mg  ------------------------------  Addendum: carbidopa-levodopa  mg was added at the last visit. She is now taking this.

## 2024-07-30 ENCOUNTER — DOCUMENTATION ONLY (OUTPATIENT)
Dept: NEUROLOGY | Facility: CLINIC | Age: 44
End: 2024-07-30
Payer: MEDICARE

## 2024-07-30 NOTE — PROGRESS NOTES
Received medication record from [facility]  Records Date of service: 7/31/24  Copy has been sent to scanning and emailed to provider

## 2024-07-31 ENCOUNTER — TRANSFERRED RECORDS (OUTPATIENT)
Dept: HEALTH INFORMATION MANAGEMENT | Facility: CLINIC | Age: 44
End: 2024-07-31
Payer: MEDICARE

## 2024-08-05 ENCOUNTER — TELEPHONE (OUTPATIENT)
Dept: NEUROLOGY | Facility: CLINIC | Age: 44
End: 2024-08-05
Payer: MEDICARE

## 2024-08-17 ENCOUNTER — HEALTH MAINTENANCE LETTER (OUTPATIENT)
Age: 44
End: 2024-08-17

## 2024-09-06 NOTE — PROGRESS NOTES
This is the second attempt to contact the patient's caregiver, Kate to discuss concerns about medications. Unfortunately, I was not able to reach her but left a message with plans for Kate to provide specific times/dates when she would be available    Faith Arita MD  Movement Disorder Fellow

## 2024-09-09 ENCOUNTER — TELEPHONE (OUTPATIENT)
Dept: NEUROLOGY | Facility: CLINIC | Age: 44
End: 2024-09-09
Payer: MEDICARE

## 2024-09-09 NOTE — TELEPHONE ENCOUNTER
TELEPHONE CALL    Attempted for a third time to contact the patient's caregiver - Kate - at her group home to discuss concerns that the patient's morning Sinemet dose is too strong. Again left a message for Kate to send a RFEyeD message or to call back into the clinic to offer specific times/dates that she could be reached to discuss this issue further    Faith Arita MD  Movement Disorder Fellow

## 2024-09-10 ENCOUNTER — TELEPHONE (OUTPATIENT)
Dept: NEUROLOGY | Facility: CLINIC | Age: 44
End: 2024-09-10
Payer: MEDICARE

## 2024-09-10 DIAGNOSIS — G20.B2 PARKINSON'S DISEASE WITH DYSKINESIA AND FLUCTUATING MANIFESTATIONS (H): Primary | ICD-10-CM

## 2024-09-10 RX ORDER — CARBIDOPA AND LEVODOPA 50; 200 MG/1; MG/1
0.5 TABLET, EXTENDED RELEASE ORAL AT BEDTIME
Qty: 15 TABLET | Refills: 5 | Status: SHIPPED | OUTPATIENT
Start: 2024-09-10 | End: 2024-09-13
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
## 2024-09-13 DIAGNOSIS — G20.B2 PARKINSON'S DISEASE WITH DYSKINESIA AND FLUCTUATING MANIFESTATIONS (H): Primary | ICD-10-CM

## 2024-09-13 RX ORDER — CARBIDOPA AND LEVODOPA 25; 100 MG/1; MG/1
0.5 TABLET ORAL EVERY MORNING
Qty: 30 TABLET | Refills: 3 | Status: SHIPPED | OUTPATIENT
Start: 2024-09-13

## 2024-09-27 ENCOUNTER — DOCUMENTATION ONLY (OUTPATIENT)
Dept: NEUROLOGY | Facility: CLINIC | Age: 44
End: 2024-09-27
Payer: MEDICARE

## 2024-09-27 NOTE — PROGRESS NOTES
Received After visit summary from [facility]. Copy emailed to Dr. Han for review and sent to scanning into the patient's chart.  Shwtea Otto CMA

## 2024-11-12 ENCOUNTER — TELEPHONE (OUTPATIENT)
Dept: NEUROLOGY | Facility: CLINIC | Age: 44
End: 2024-11-12
Payer: MEDICARE

## 2024-11-12 NOTE — TELEPHONE ENCOUNTER
"TELEPHONE CALL    I was able to touch base with Fiona's caregiver at her group home, Kate, to discuss Fiona's recent trip to [facility] ED. Kate reported that the ED trip was the result of a distinct episode of symptoms where the patient was complaining of chest pain, rolling on the ground, and growling. Throughout this period she was able to communicate with staff and did not exhibit any convulsions suggestive of seizure. She has had similar episodes in the past, tending to occur around every 3-4mo. Work-up at the ED was unremarkable and she has since returned to her normal state. Kate also mentions that Fiona was able to meet with her psychiatrist today who believes these episodes are manifestations of \"psychosis\" and so has started Fiona on Olanzapine 2.5mg at bedtime. She has previously taken Olanzapine PRN for agitation without issue    Kate also notes that with these past ED visits for similar episodes, the patient often receives some form of IV antipsychotic. With this most recent episode, Fiona was given IV Droperidol by EMT. Interestingly, Kate reports that these medications actually often end up helping Fiona quite a bit beyond calming her agitation/anxiety and actually tend to smooth out her parkinsonisms/dyskinesia to the point that she doesn't really shake and has an easier time walking until the next day    Outside of this episode, she reports that [place] PD symptoms seem to be under fair control. She remains quite brittle, easily swinging from becoming slow/stiff to dyskinetic. Fiona prefers a bit of dyskinesia to her parkinsonism. Still, at present, the patient and her caregivers are happy with her dose and do not wish to make any changes    PLAN  - No changes were made to the patient's PD medications  - I spoke at length with Carrier concerning this new addition of scheduled Olanzapine. As Fiona has had this medication before without issue, I have lower suspicion that it " will cause overly negative effects in terms of her PD, but did provide some education and red flag signs to watch out for. Namely, that Olanzapine, as a dopamine-blocking agent, runs the risk of worsening Fiona's parkinsonisms. In addition, the group home should also be on the watch for Parkinsonism Hyperpyrexia Syndrome. This is less likely with second generation antipsychotics such as Olanzapine, but should absolutely be considered before giving first generation antipsychotics including Droperidol, Haldol, Thorazine, etc  - Follow-up with Dr Han as previously scheduled on 12/6/2024    Faith Arita MD  Movement Disorder Fellow

## 2025-02-13 ENCOUNTER — OFFICE VISIT (OUTPATIENT)
Dept: OPTOMETRY | Facility: CLINIC | Age: 45
End: 2025-02-13
Payer: MEDICARE

## 2025-02-13 DIAGNOSIS — H52.03 HYPEROPIA OF BOTH EYES: Primary | ICD-10-CM

## 2025-02-13 DIAGNOSIS — H55.01 CONGENITAL NYSTAGMUS: ICD-10-CM

## 2025-02-13 DIAGNOSIS — H01.006 BLEPHARITIS OF BOTH EYES, UNSPECIFIED EYELID, UNSPECIFIED TYPE: ICD-10-CM

## 2025-02-13 DIAGNOSIS — H01.003 BLEPHARITIS OF BOTH EYES, UNSPECIFIED EYELID, UNSPECIFIED TYPE: ICD-10-CM

## 2025-02-13 DIAGNOSIS — H52.4 PRESBYOPIA: ICD-10-CM

## 2025-02-13 DIAGNOSIS — H50.15 ALTERNATING EXOTROPIA: ICD-10-CM

## 2025-02-13 ASSESSMENT — VISUAL ACUITY
OS_SC: 20/200
METHOD: SNELLEN - LINEAR
OS_SC: 20/100
OD_SC: 20/100
OD_SC: 20/200

## 2025-02-13 ASSESSMENT — TONOMETRY: IOP_METHOD: BOTH EYES NORMAL BY PALPATION

## 2025-02-13 ASSESSMENT — REFRACTION_MANIFEST
OD_SPHERE: +5.50
OS_CYLINDER: +0.50
OS_SPHERE: +4.00
OD_AXIS: 147
OD_CYLINDER: +0.50
OS_AXIS: 165
OS_ADD: +2.25
OD_SPHERE: +6.00
OS_AXIS: 165
OS_SPHERE: +4.75
OD_CYLINDER: +0.50
METHOD_AUTOREFRACTION: 1
OD_AXIS: 150
OD_ADD: +2.25
OS_CYLINDER: +0.50

## 2025-02-13 ASSESSMENT — REFRACTION_WEARINGRX
OS_ADD: +1.75
OS_SPHERE: +3.50
OD_SPHERE: +5.50
OD_AXIS: 180
OD_ADD: +1.75
OS_CYLINDER: +1.00
OS_AXIS: 172
OD_CYLINDER: +1.00

## 2025-02-13 ASSESSMENT — CUP TO DISC RATIO
OS_RATIO: 0.3
OD_RATIO: 0.4

## 2025-02-13 ASSESSMENT — CONF VISUAL FIELD
METHOD: COUNTING FINGERS
OS_NORMAL: 1
OD_NORMAL: 1
OD_INFERIOR_TEMPORAL_RESTRICTION: 0
OD_SUPERIOR_TEMPORAL_RESTRICTION: 0
OD_INFERIOR_NASAL_RESTRICTION: 0
OS_INFERIOR_NASAL_RESTRICTION: 0
OS_SUPERIOR_TEMPORAL_RESTRICTION: 0
OD_SUPERIOR_NASAL_RESTRICTION: 0
OS_INFERIOR_TEMPORAL_RESTRICTION: 0
OS_SUPERIOR_NASAL_RESTRICTION: 0

## 2025-02-13 ASSESSMENT — SLIT LAMP EXAM - LIDS
COMMENTS: 1+ BLEPHARITIS
COMMENTS: 1+ BLEPHARITIS

## 2025-02-13 ASSESSMENT — EXTERNAL EXAM - RIGHT EYE: OD_EXAM: NORMAL

## 2025-02-13 ASSESSMENT — EXTERNAL EXAM - LEFT EYE: OS_EXAM: NORMAL

## 2025-02-13 NOTE — PATIENT INSTRUCTIONS
Ordered new glasses in a bifocal  Current unable to be repaired , needs new frame      Very little change in prescription   Ocular health stable

## 2025-02-13 NOTE — PROGRESS NOTES
Chief Complaint   Patient presents with    Annual Eye Exam      Accompanied by staff member   Last Eye Exam: 09/2022  Dilated Previously: Yes, side effects of dilation explained today    What are you currently using to see?  Glasses - broke them a few months ago - can't wear them        Distance Vision Acuity: Satisfied with vision with glasses     Near Vision Acuity: Satisfied with vision while reading and using computer with glasses    Eye Comfort: good  Do you use eye drops? : No      Aleksandra Jett - Optometric Assistant           Medical, surgical and family histories reviewed and updated 2/13/2025.       OBJECTIVE: See Ophthalmology exam    ASSESSMENT:    ICD-10-CM    1. Hyperopia of both eyes  H52.03 EYE EXAM (SIMPLE-NONBILLABLE)     REFRACTION      2. Presbyopia  H52.4 EYE EXAM (SIMPLE-NONBILLABLE)     REFRACTION      3. Blepharitis of both eyes, unspecified eyelid, unspecified type  H01.003     H01.006       4. 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

## 2025-02-13 NOTE — LETTER
2/13/2025      Fiona Murphy  [address] [place] [address]      Dear Colleague,    Thank you for referring your patient, Fiona Murphy, to the [facility]. Please see a copy of my visit note below.    Chief Complaint   Patient presents with     Annual Eye Exam      Accompanied by staff member   Last Eye Exam: 09/2022  Dilated Previously: Yes, side effects of dilation explained today    What are you currently using to see?  Glasses - broke them a few months ago - can't wear them        Distance Vision Acuity: Satisfied with vision with glasses     Near Vision Acuity: Satisfied with vision while reading and using computer with glasses    Eye Comfort: good  Do you use eye drops? : No      Aleksandra Jett - Optometric Assistant           Medical, surgical and family histories reviewed and updated 2/13/2025.       OBJECTIVE: See Ophthalmology exam    ASSESSMENT:    ICD-10-CM    1. Hyperopia of both eyes  H52.03 EYE EXAM (SIMPLE-NONBILLABLE)     REFRACTION      2. Presbyopia  H52.4 EYE EXAM (SIMPLE-NONBILLABLE)     REFRACTION      3. Blepharitis of both eyes, unspecified eyelid, unspecified type  H01.003     H01.006       4. 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        Sincerely,        Nataliia Fry, OD    Electronically signed

## 2025-02-25 ENCOUNTER — APPOINTMENT (OUTPATIENT)
Dept: OPTOMETRY | Facility: CLINIC | Age: 45
End: 2025-02-25
Payer: MEDICARE

## 2025-02-25 PROCEDURE — 92341 FIT SPECTACLES BIFOCAL: CPT | Performed by: OPTOMETRIST

## 2025-05-01 DIAGNOSIS — G20.A1 PARKINSON'S DISEASE (H): ICD-10-CM

## 2025-05-01 RX ORDER — ENTACAPONE 200 MG/1
TABLET ORAL
Qty: 31 TABLET | Refills: 11 | Status: SHIPPED | OUTPATIENT
Start: 2025-05-01

## 2025-05-01 NOTE — TELEPHONE ENCOUNTER
If you'd like me to request a follow-up appointment with you, I can send our clinic coordinators a message. There is a history of cancelling / no-showing appointments so I wanted to ask you prior to messaging them. Thanks!                       RX Authorization    Medication: entacapone (COMTAN) 200 MG tablet     Date last refill ordered: 4/29/24    Quantity ordered: 31    # refills: 11    Date of last clinic visit with ordering provider: 7/26/24    Date of next clinic visit with ordering provider: None scheduled

## 2025-05-06 DIAGNOSIS — G20.B2 PARKINSON'S DISEASE WITH DYSKINESIA AND FLUCTUATING MANIFESTATIONS (H): ICD-10-CM

## 2025-05-06 NOTE — TELEPHONE ENCOUNTER
RX Authorization    Medication: Carbidopa-levodopa (SINEMET)  MG tablet     Date last refill ordered: 9/13/24    Quantity ordered: 30 (60 day supply)    # refills: 3    Date of last clinic visit with ordering provider: 7/26/24    Date of next clinic visit with ordering provider: None scheduled

## 2025-05-07 RX ORDER — CARBIDOPA AND LEVODOPA 25; 100 MG/1; MG/1
0.5 TABLET ORAL EVERY MORNING
Qty: 30 TABLET | Refills: 3 | Status: SHIPPED | OUTPATIENT
Start: 2025-05-07

## 2025-05-08 ENCOUNTER — PATIENT OUTREACH (OUTPATIENT)
Dept: CARE COORDINATION | Facility: CLINIC | Age: 45
End: 2025-05-08
Payer: MEDICARE

## 2025-05-21 ENCOUNTER — APPOINTMENT (OUTPATIENT)
Dept: OPTOMETRY | Facility: CLINIC | Age: 45
End: 2025-05-21
Payer: MEDICARE

## 2025-06-04 DIAGNOSIS — G20.A1 PARKINSON'S DISEASE (H): ICD-10-CM

## 2025-06-04 NOTE — TELEPHONE ENCOUNTER
Neuroscience Clinic Task Note    TASK    Neurology Rx Refill  Medication carbidopa-levodopa ER (RYTARY) 23.75-95 MG CPCR per CR capsule    Dose    Last refill ordered (m/d/y) 6/3/24   Last quantity ordered 60   Last # refills 11   Last clinic visit with ordering provider (m/d/y) 7/26/24   Next clinic visit with ordering provider (m/d/y) None scheduled   All pertinent protocol data (lab date/result)    Pertinent information from patient's message        FOLLOW-UP      ADDITIONAL COMMENTS      PEPITO GARCIA

## 2025-06-05 RX ORDER — LEVODOPA AND CARBIDOPA 95; 23.75 MG/1; MG/1
CAPSULE, EXTENDED RELEASE ORAL
Qty: 60 CAPSULE | Refills: 11 | Status: SHIPPED | OUTPATIENT
Start: 2025-06-05

## 2025-06-11 ENCOUNTER — TELEPHONE (OUTPATIENT)
Dept: NEUROLOGY | Facility: CLINIC | Age: 45
End: 2025-06-11
Payer: MEDICARE

## 2025-06-12 NOTE — TELEPHONE ENCOUNTER
Retail Pharmacy Prior Authorization Team   Phone: [phone]    Prior Authorization Approval    Medication: RYTARY 23.75-95 MG PO CPCR  Authorization Effective Date: 1/1/2025  Authorization Expiration Date: 6/12/2026  Insurance Company: [employer] - Phone [phone] Fax [phone]  Which Pharmacy is filling the prescription: [facility]. - [place], [place] - [address] [place] [address]. [address].  Pharmacy Notified: YES  Patient Notified: **Instructed pharmacy to notify patient when script is ready to /ship.**

## 2025-06-12 NOTE — TELEPHONE ENCOUNTER
Retail Pharmacy Prior Authorization Team   Phone: [phone]  PA Initiation    Medication: RYTARY 23.75-95 MG PO CPCR  Insurance Company: [employer] - Phone [phone] Fax [phone]  Pharmacy Filling the Rx: [facility] - [place], [place] [facility] [address] [facility] [address]  Filling Pharmacy Phone: [phone]  Filling Pharmacy Fax:    Start Date: 6/12/2025      Note: Due to record-high volumes, our turn-around time is taking longer than usual . We are currently 3  business days behind in the pools.   We are working diligently to submit all requests in a timely manner and in the order they are received. Please only flag TRUE URGENT requests as high priority to the pool at this time.   If you have questions on status of PA's,  please send a note/message in the active PA encounter and send back to the [facility] PA pool [[id number]].    If you have questions about the turn-around time or about our process, please reach out to our supervisor Abbie Mari.   Thank you!   RPPA (Retail Pharmacy Prior Authorization) team

## 2025-07-02 ENCOUNTER — TELEPHONE (OUTPATIENT)
Dept: NEUROLOGY | Facility: CLINIC | Age: 45
End: 2025-07-02
Payer: MEDICARE

## 2025-07-02 NOTE — TELEPHONE ENCOUNTER
Health Call Center    Phone Message    May a detailed message be left on voicemail: yes     Reason for Call: Medication Refill Request    Has the patient contacted the pharmacy for the refill? Yes   Name of medication being requested: carbidopa-levodopa ER (RYTARY) 23.75-95 MG CPCR per CR capsule  Provider who prescribed the medication: Tonny Han MD  [facility]  Date medication is needed: 07/02/2025     Patient's caregiver Lula calling in requesting a refill. Please reach out to Lula with any questions at [phone].    Action Taken: Message routed to:  [facility] Neurology

## 2025-07-03 NOTE — TELEPHONE ENCOUNTER
[facility] Call Center    Phone Message    May a detailed message be left on voicemail: yes     Reason for Call: Medication Refill Request    Has the patient contacted the pharmacy for the refill? Yes   Name of medication being requested: carbidopa-levodopa ER (RYTARY) 36. MG CPCR per CR capsule  Provider who prescribed the medication: Dr. Han  Pharmacy: [facility] [address] [facility] [address]   Date medication is needed: 7/3/25     Maribel stated that Fiona has been out of this medication since last Friday and they are noticing some behavioral changes.    The medication that has been being requested is the 23.75 capsules, which they have at the group home.    Action Taken: Message routed to:  [facility]    Travel Screening: Not Applicable     Date of Service:

## 2025-07-06 ENCOUNTER — HEALTH MAINTENANCE LETTER (OUTPATIENT)
Age: 45
End: 2025-07-06

## 2025-07-07 DIAGNOSIS — G20.A1 PARKINSON'S DISEASE (H): ICD-10-CM

## 2025-07-07 NOTE — TELEPHONE ENCOUNTER
[facility] Call Center    Phone Message    May a detailed message be left on voicemail: yes     Reason for Call: Medication Refill Request    Has the patient contacted the pharmacy for the refill? Yes   Name of medication being requested: carbidopa-levodopa ER (RYTARY) 36. MG CPCR per CR capsule  Provider who prescribed the medication: Tonny Han MD   Pharmacy: [facility]. [place], [place]   Date medication is needed: 07/07/2025      Valerie with [facility] pharmacy requesting refill on the 36. MG strength of the carbidopa-levodopa ER (RYTARY)    Action Taken: Message routed to:  [facility] Neurology    Travel Screening: Not Applicable

## 2025-07-07 NOTE — TELEPHONE ENCOUNTER
[facility] Call Center    Phone Message    May a detailed message be left on voicemail: no     Reason for Call: Other: Maribel with patients group home is requesting an update on medication refill. Pt has been without this medication.     Please reach out to further advise  # [phone]    Action Taken: Other: Neurology    Travel Screening: Not Applicable

## 2025-07-07 NOTE — TELEPHONE ENCOUNTER
Spoke with group home RN. RN was frustrated that the pharmacy wouldn't dispence the Rytary. I stated that the RN should call the pharmacy as I was told by a pharmacist with [facility] that there is not a form that needs to be signed and that the medication is available to be picked up.

## 2025-07-08 DIAGNOSIS — G20.A1 PARKINSON'S DISEASE (H): ICD-10-CM

## 2025-07-08 RX ORDER — LEVODOPA AND CARBIDOPA 145; 36.25 MG/1; MG/1
CAPSULE, EXTENDED RELEASE ORAL
Qty: 180 CAPSULE | Refills: 2 | Status: CANCELLED | OUTPATIENT
Start: 2025-07-08

## 2025-07-08 RX ORDER — LEVODOPA AND CARBIDOPA 145; 36.25 MG/1; MG/1
CAPSULE, EXTENDED RELEASE ORAL
Qty: 180 CAPSULE | Refills: 1 | Status: SHIPPED | OUTPATIENT
Start: 2025-07-08

## 2025-07-08 NOTE — TELEPHONE ENCOUNTER
Medication and strength: Rytary 36. mg    Is this a [facility] patient? Yes    Last appointment: 7/26/2024 with Dr. Han and Dr. Arita    Next appointment: None scheduled    Last re-ordered: 6/3/2024 for a 30 day supply with 11 refills    Action taken: Will send an RX to be signed with a note stating no more refills until an appointment is made with Dr. Han

## 2025-07-08 NOTE — TELEPHONE ENCOUNTER
Neuroscience Clinic Task Note    TASK    Neurology Rx Refill  Medication carbidopa-levodopa ER (RYTARY) 36. MG CPCR per CR capsule    Dose Take 2 capsules by mouth at 9:30am, 1pm and 4:30pm = 6/day    Last refill ordered (m/d/y) 06/03/20204   Last quantity ordered 180   Last # refills 11   Last clinic visit with ordering provider (m/d/y) 07/26/2024   Next clinic visit with ordering provider (m/d/y) None Scheduled   All pertinent protocol data (lab date/result)    Pertinent information from patient's message        FOLLOW-UP      ADDITIONAL COMMENTS  Pt is due for a follow up. T'd up 90 days refill, added a note to pharmacy, and messaged the pt to schedule a return.       Hattie Fuentes